# Patient Record
Sex: MALE | Race: BLACK OR AFRICAN AMERICAN
[De-identification: names, ages, dates, MRNs, and addresses within clinical notes are randomized per-mention and may not be internally consistent; named-entity substitution may affect disease eponyms.]

---

## 2017-06-02 ENCOUNTER — HOSPITAL ENCOUNTER (INPATIENT)
Dept: HOSPITAL 17 - NEPE | Age: 47
LOS: 4 days | Discharge: HOME | DRG: 71 | End: 2017-06-06
Payer: COMMERCIAL

## 2017-06-02 VITALS — WEIGHT: 277.12 LBS | BODY MASS INDEX: 44.54 KG/M2 | HEIGHT: 66 IN

## 2017-06-02 VITALS
TEMPERATURE: 99 F | SYSTOLIC BLOOD PRESSURE: 164 MMHG | OXYGEN SATURATION: 100 % | RESPIRATION RATE: 28 BRPM | HEART RATE: 102 BPM | DIASTOLIC BLOOD PRESSURE: 75 MMHG

## 2017-06-02 VITALS
DIASTOLIC BLOOD PRESSURE: 145 MMHG | TEMPERATURE: 98.1 F | SYSTOLIC BLOOD PRESSURE: 283 MMHG | HEART RATE: 86 BPM | OXYGEN SATURATION: 97 % | RESPIRATION RATE: 15 BRPM

## 2017-06-02 VITALS
HEART RATE: 70 BPM | DIASTOLIC BLOOD PRESSURE: 62 MMHG | RESPIRATION RATE: 16 BRPM | OXYGEN SATURATION: 96 % | SYSTOLIC BLOOD PRESSURE: 121 MMHG

## 2017-06-02 VITALS
OXYGEN SATURATION: 96 % | HEART RATE: 70 BPM | RESPIRATION RATE: 16 BRPM | DIASTOLIC BLOOD PRESSURE: 72 MMHG | SYSTOLIC BLOOD PRESSURE: 156 MMHG

## 2017-06-02 VITALS
OXYGEN SATURATION: 98 % | DIASTOLIC BLOOD PRESSURE: 114 MMHG | SYSTOLIC BLOOD PRESSURE: 219 MMHG | HEART RATE: 64 BPM | RESPIRATION RATE: 18 BRPM

## 2017-06-02 VITALS
HEART RATE: 79 BPM | RESPIRATION RATE: 16 BRPM | SYSTOLIC BLOOD PRESSURE: 209 MMHG | DIASTOLIC BLOOD PRESSURE: 93 MMHG | OXYGEN SATURATION: 95 %

## 2017-06-02 VITALS
OXYGEN SATURATION: 100 % | SYSTOLIC BLOOD PRESSURE: 204 MMHG | DIASTOLIC BLOOD PRESSURE: 88 MMHG | HEART RATE: 74 BPM | RESPIRATION RATE: 18 BRPM

## 2017-06-02 VITALS — HEART RATE: 74 BPM

## 2017-06-02 VITALS
SYSTOLIC BLOOD PRESSURE: 147 MMHG | OXYGEN SATURATION: 100 % | HEART RATE: 74 BPM | RESPIRATION RATE: 15 BRPM | DIASTOLIC BLOOD PRESSURE: 63 MMHG | TEMPERATURE: 98.7 F

## 2017-06-02 VITALS — DIASTOLIC BLOOD PRESSURE: 57 MMHG | SYSTOLIC BLOOD PRESSURE: 147 MMHG | TEMPERATURE: 98.3 F

## 2017-06-02 VITALS — HEART RATE: 67 BPM

## 2017-06-02 DIAGNOSIS — E11.9: ICD-10-CM

## 2017-06-02 DIAGNOSIS — R51: ICD-10-CM

## 2017-06-02 DIAGNOSIS — I16.9: ICD-10-CM

## 2017-06-02 DIAGNOSIS — G47.33: ICD-10-CM

## 2017-06-02 DIAGNOSIS — G93.9: Primary | ICD-10-CM

## 2017-06-02 DIAGNOSIS — E66.9: ICD-10-CM

## 2017-06-02 DIAGNOSIS — Z79.84: ICD-10-CM

## 2017-06-02 DIAGNOSIS — I15.9: ICD-10-CM

## 2017-06-02 LAB
ANION GAP SERPL CALC-SCNC: 6 MEQ/L (ref 5–15)
APTT BLD: 27 SEC (ref 24.3–30.1)
BASOPHILS # BLD AUTO: 0.1 TH/MM3 (ref 0–0.2)
BASOPHILS NFR BLD: 0.6 % (ref 0–2)
BUN SERPL-MCNC: 18 MG/DL (ref 7–18)
CHLORIDE SERPL-SCNC: 102 MEQ/L (ref 98–107)
EOSINOPHIL # BLD: 0.1 TH/MM3 (ref 0–0.4)
EOSINOPHIL NFR BLD: 0.7 % (ref 0–4)
ERYTHROCYTE [DISTWIDTH] IN BLOOD BY AUTOMATED COUNT: 13.6 % (ref 11.6–17.2)
GFR SERPLBLD BASED ON 1.73 SQ M-ARVRAT: 81 ML/MIN (ref 89–?)
HCO3 BLD-SCNC: 28.3 MEQ/L (ref 21–32)
HCT VFR BLD CALC: 35.7 % (ref 39–51)
HEMO FLAGS: (no result)
INR PPP: 0.9 RATIO
LYMPHOCYTES # BLD AUTO: 1.8 TH/MM3 (ref 1–4.8)
LYMPHOCYTES NFR BLD AUTO: 22.2 % (ref 9–44)
MCH RBC QN AUTO: 27.7 PG (ref 27–34)
MCHC RBC AUTO-ENTMCNC: 33.3 % (ref 32–36)
MCV RBC AUTO: 83.2 FL (ref 80–100)
MONOCYTES NFR BLD: 8.3 % (ref 0–8)
NEUTROPHILS # BLD AUTO: 5.5 TH/MM3 (ref 1.8–7.7)
NEUTROPHILS NFR BLD AUTO: 68.2 % (ref 16–70)
PLATELET # BLD: 310 TH/MM3 (ref 150–450)
POTASSIUM SERPL-SCNC: 3.8 MEQ/L (ref 3.5–5.1)
PROTHROMBIN TIME: 10.1 SEC (ref 9.8–11.6)
RBC # BLD AUTO: 4.29 MIL/MM3 (ref 4.5–5.9)
SODIUM SERPL-SCNC: 136 MEQ/L (ref 136–145)
WBC # BLD AUTO: 8.1 TH/MM3 (ref 4–11)

## 2017-06-02 PROCEDURE — 74177 CT ABD & PELVIS W/CONTRAST: CPT

## 2017-06-02 PROCEDURE — 85610 PROTHROMBIN TIME: CPT

## 2017-06-02 PROCEDURE — 70496 CT ANGIOGRAPHY HEAD: CPT

## 2017-06-02 PROCEDURE — 71260 CT THORAX DX C+: CPT

## 2017-06-02 PROCEDURE — 80048 BASIC METABOLIC PNL TOTAL CA: CPT

## 2017-06-02 PROCEDURE — 85730 THROMBOPLASTIN TIME PARTIAL: CPT

## 2017-06-02 PROCEDURE — 71010: CPT

## 2017-06-02 PROCEDURE — A9579 GAD-BASE MR CONTRAST NOS,1ML: HCPCS

## 2017-06-02 PROCEDURE — 70553 MRI BRAIN STEM W/O & W/DYE: CPT

## 2017-06-02 PROCEDURE — C9113 INJ PANTOPRAZOLE SODIUM, VIA: HCPCS

## 2017-06-02 PROCEDURE — 80053 COMPREHEN METABOLIC PANEL: CPT

## 2017-06-02 PROCEDURE — 96365 THER/PROPH/DIAG IV INF INIT: CPT

## 2017-06-02 PROCEDURE — 70450 CT HEAD/BRAIN W/O DYE: CPT

## 2017-06-02 PROCEDURE — 82948 REAGENT STRIP/BLOOD GLUCOSE: CPT

## 2017-06-02 PROCEDURE — 85025 COMPLETE CBC W/AUTO DIFF WBC: CPT

## 2017-06-02 PROCEDURE — 70498 CT ANGIOGRAPHY NECK: CPT

## 2017-06-02 PROCEDURE — 96375 TX/PRO/DX INJ NEW DRUG ADDON: CPT

## 2017-06-02 RX ADMIN — HYDROCHLOROTHIAZIDE SCH MG: 25 TABLET ORAL at 18:39

## 2017-06-02 RX ADMIN — NICARDIPINE HYDROCHLORIDE SCH MLS/HR: 2.5 INJECTION INTRAVENOUS at 23:34

## 2017-06-02 RX ADMIN — LISINOPRIL SCH MG: 20 TABLET ORAL at 18:39

## 2017-06-02 RX ADMIN — INSULIN ASPART SCH: 100 INJECTION, SOLUTION INTRAVENOUS; SUBCUTANEOUS at 18:55

## 2017-06-02 RX ADMIN — PHENYTOIN SODIUM SCH MLS/HR: 50 INJECTION INTRAMUSCULAR; INTRAVENOUS at 14:30

## 2017-06-02 RX ADMIN — Medication SCH ML: at 21:47

## 2017-06-02 RX ADMIN — NICARDIPINE HYDROCHLORIDE SCH MLS/HR: 2.5 INJECTION INTRAVENOUS at 17:38

## 2017-06-02 RX ADMIN — INSULIN ASPART SCH: 100 INJECTION, SOLUTION INTRAVENOUS; SUBCUTANEOUS at 23:48

## 2017-06-02 RX ADMIN — NICARDIPINE HYDROCHLORIDE SCH MLS/HR: 2.5 INJECTION INTRAVENOUS at 19:40

## 2017-06-02 RX ADMIN — STANDARDIZED SENNA CONCENTRATE AND DOCUSATE SODIUM SCH TAB: 8.6; 5 TABLET, FILM COATED ORAL at 19:39

## 2017-06-02 NOTE — PD.CONS
__________________________________________________ (Hebert Maldonado MD)





HPI


Consult Requested By





Primary Care Physician


No Primary Care Physician


  (Hebert Maldonado MD)


Service


N[facility]


Consult Requested By


ED Physician


Reason for Consult


Aneurysm


History of Present Illness


Mr. Norwood is a 47-year-old male who presents to [facility] ED today with 

complaints of headaches.  The history was obtained with the aid of his wife at 

bedside as the patient received pain medication and is drowsy.  His wife 

reports the patient had complained of headaches located behind the right eye 

this morning.  It was severe enough that he presented to the ED for further 

evaluation.  His wife reports he has had headaches previously that comes and 

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  An MRI of brain was obtained 

which showed a mass in the right paramidline frontal possible vascular 

malformation versus cavernous angioma.  The patient denies focal weakness, 

double vision, seizures, nausea, vomiting.  He reports no personal history of 

aneurysm, tumors or cancers.  A neurosurgical evaluation was requested.  (Cira Pozo)





Review of Systems


Constitutional:  DENIES: Fever, Chills


Eyes:  DENIES: Vision loss, Double Vision


Ears, nose, mouth, throat:  DENIES: Hearing loss


Respiratory:  DENIES: Apneas, Hemoptysis, Shortness of breath


Cardiovascular:  DENIES: Chest pain


Genitourinary:  DENIES: Urinary incontinence


Neurologic:  COMPLAINS OF: Headache,  DENIES: Paresthesias, Seizures, Speech 

Problems, Poor Balance (Cira Pozo)





Past Family Social History


Allergies:  


Coded Allergies:  


     No Known Allergies (Verified , 4/3/16)


Past Medical History


Hypertension


Diabetes 


Sleep Apnea


Past Surgical History


No previous surgical history


Reported Medications


reviewed in EMR


Active Ordered Medications





Current Medications








 Medications


  (Trade)  Dose


 Ordered  Sig/Collin


 Route


 PRN Reason  Start Time


 Stop Time Status Last Admin


Dose Admin


 


 Sodium Chloride


  (NS 1000 ml Inj)  1,000 ml @ 


 84 mls/hr  [id number]


 IV


   6/2/17 14:30


     


 


 


 Sodium Chloride


  (NS Flush)  2 ml  UNSCH  PRN


 IV FLUSH


 FLUSH AFTER USING IV ACCESS  6/2/17 14:00


     


 


 


 Sodium Chloride


  (NS Flush)  2 ml  BID


 IV FLUSH


   6/2/17 21:00


     


 


 


 Acetaminophen


  (Tylenol)  650 mg  Q6H  PRN


 PO


 PAIN 1-10 AND/OR FEVER >101F  6/2/17 14:00


     


 


 


 Pantoprazole


 Sodium


  (Protonix Inj)  40 mg  DAILY


 IV


   6/3/17 09:00


     


 


 


 Ondansetron HCl


  (Zofran Inj)  4 mg  Q6H  PRN


 IV


 NAUSEA OR VOMITING  6/2/17 14:00


     


 


 


 Miscellaneous


 Information  1  Q361D


 XX


   6/2/17 14:00


    6/2/17 14:00


 


 


 Chlorhexidine


 Gluconate


  (Chlorhexidine


 2% Cloth)  3 pack


 Taper  DAILY@04


 TOP


   6/3/17 04:00


 5/30/18 03:59   


 


 


 Chlorhexidine


 Gluconate


  (Chlorhexidine


 2% Cloth)  3 pack  UNSCH  PRN


 TOP


 HYGIENIC CARE  6/2/17 14:00


     


 


 


 Senna/Docusate


 Sodium


  (Kaitlyn-Colace)  1 tab  BID


 PO


   6/2/17 21:00


     


 


 


 Magnesium


 Hydroxide


  (Milk Of


 Magnesia Liq)  30 ml  Q12H  PRN


 PO


 MILD - MODERATE CONSTIPATION  6/2/17 14:00


     


 


 


 Sennosides


  (Senokot)  17.2 mg  Q12H  PRN


 PO


 MODERATE - SEVERE CONSTIPATION  6/2/17 14:00


     


 


 


 Bisacodyl


  (Dulcolax Supp)  10 mg  DAILY  PRN


 RECTAL


 SEVERE CONSITIPATION  6/2/17 14:00


     


 


 


 Lactulose 30 ml  30 ml  DAILY  PRN


 PO


 SEVERE CONSITIPATION  6/2/17 14:00


     


 


 


 Nicardipine HCl/


 Sodium Chloride


  (Cardene Inj/NS


 250 ml Inj)  260 ml @ 0


 mls/hr  TITRATE


 IV


   6/2/17 16:00


     


 


 


 Insulin Aspart


  (NovoLOG


 SUPPLEMENTAL


 SCALE)  1  Q6HR


 SQ


   6/2/17 18:00


     


 


 


 Dextrose


  (D50w (Vial) Inj)  25 ml  UNSCH  PRN


 IV


 HYPOGLYCEMIA-SEE COMMENTS  6/2/17 14:15


     


 


 


 Glucagon


  (Glucagon Inj)  1 mg  UNSCH  PRN


 IM/SQ


 HYPOGLYCEMIA-SEE COMMENTS  6/2/17 14:15


     


 


 


 Labetalol HCl


  (Trandate Inj)  20 mg  Q2HR  PRN


 IV PUSH


 SBP greater than 180mm Hg  6/2/17 15:30


   UNV  


 








Family History


denies family history of aneurysm, tumors or cancers.  has history of heart 

disease in family


Social History


denies tobacco, etoh, or illicit drug use.  (Cira Pozo)





Physical Exam


Vital Signs





 Vital Signs








  Date Time  Temp Pulse Resp B/P Pulse Ox O2 Delivery O2 Flow Rate FiO2


 


6/2/17 12:53  79 16 209/93 95   


 


6/2/17 10:18  64 18 219/114 98 Room Air  


 


6/2/17 09:19  74 18 204/88 100 Room Air  


 


6/2/17 08:58 98.1 86 15 283/145 97   








Physical Exam


Mr. Norwood is awake, but drowsy due to pain medications.  Oriented to time, 

place and person. Speech is fluent.  Follows simple commands. 





Cranial nerve examination: pupils 2-3 equal, round, and reactive to light. Extra

-ocular movements are intact. Facial motor and sensory function are normal and 

symmetrical. Gross hearing is intact, bilaterally. The uvula is midline and 

elevates symmetrically with the soft palate.  





Neck is soft and supple.   No meningismus or nuchal rigidity. 





Motor: moves both upper and lower extremities symmetrically





Sensory examination is intact to light touch in both the upper and lower 

extremities, symmetrically.  





Deep tendon reflexes are 1+ and symmetrical in the biceps, triceps, and 

brachioradialis, bilaterally, in the upper extremities. In the lower extremities

, the patellar and Achilles are 1+, bilaterally.  There is a bilateral plantar 

flexion response. Hoffmanns sign is negative.  





Cerebellar examination is intact


Laboratory





Laboratory Tests








Test 6/2/17 6/2/17





 09:25 11:00


 


White Blood Count 8.1  


 


Red Blood Count 4.29  


 


Hemoglobin 11.9  


 


Hematocrit 35.7  


 


Mean Corpuscular Volume 83.2  


 


Mean Corpuscular Hemoglobin 27.7  


 


Mean Corpuscular Hemoglobin 33.3  





Concent  


 


Red Cell Distribution Width 13.6  


 


Platelet Count 310  


 


Mean Platelet Volume 8.8  


 


Neutrophils (%) (Auto) 68.2  


 


Lymphocytes (%) (Auto) 22.2  


 


Monocytes (%) (Auto) 8.3  


 


Eosinophils (%) (Auto) 0.7  


 


Basophils (%) (Auto) 0.6  


 


Neutrophils # (Auto) 5.5  


 


Lymphocytes # (Auto) 1.8  


 


Monocytes # (Auto) 0.7  


 


Eosinophils # (Auto) 0.1  


 


Basophils # (Auto) 0.1  


 


CBC Comment DIFF FINAL  


 


Differential Comment   


 


Sodium Level 136  


 


Potassium Level 3.8  


 


Chloride Level 102  


 


Carbon Dioxide Level 28.3  


 


Anion Gap 6  


 


Blood Urea Nitrogen 18  


 


Creatinine 1.17  


 


Estimat Glomerular Filtration 81  





Rate  


 


Random Glucose 161  


 


Calcium Level 8.6  


 


Prothrombin Time  10.1 


 


Prothromb Time International  0.9 





Ratio  


 


Activated Partial  27.0 





Thromboplast Time  





 (Hebert Maldonado MD)


Physical Exam


Mr. Norwood is awake, but drowsy due to pain medications.  Oriented to time, 

place and person. Speech is fluent.  Follows simple commands. 





Cranial nerve examination: pupils 2-3 equal, round, and reactive to light. Extra

-ocular movements are intact. Facial motor and sensory function are normal and 

symmetrical. Gross hearing is intact, bilaterally. The uvula is midline and 

elevates symmetrically with the soft palate.  





Neck is soft and supple.   No meningismus or nuchal rigidity. 





Motor: moves both upper and lower extremities symmetrically





Sensory examination is intact to light touch in both the upper and lower 

extremities, symmetrically.  





Deep tendon reflexes are 1+ and symmetrical in the biceps, triceps, and 

brachioradialis, bilaterally, in the upper extremities. In the lower extremities

, the patellar and Achilles are 1+, bilaterally.  There is a bilateral plantar 

flexion response. Hoffmanns sign is negative.  





Cerebellar examination is intact to finger-to-nose test  (Cira Pozo)


Result Diagram:  


6/2/17 0925 6/2/17 0925





Imaging





Last Impressions








Head CT 6/2/17 0918 Signed





Impressions: 





 Service Date/Time:  Friday, June 2, 2017 09:32 - CONCLUSION:  1. 1 cm rounded 





 hyperdensity in the right para midline frontal region just anterior to the 





 anterior horns of the lateral ventricles. 2. Differential diagnosis is 





 extra-axial mass such as meningioma versus small cortical parenchymal 

hemorrhage 





 or small extra axial hemorrhage. Recommend MRI brain with and without contrast 





 for further evaluation.     Mariusz Sadler MD 


 


Chest X-Ray 6/2/17 0000 Signed





Impressions: 





 Service Date/Time:  Friday, June 2, 2017 14:24 - CONCLUSION:  1. Hypoinflation 





 with no acute infiltrate. 2. Borderline prominent but well compensated heart. 

   





  Harry Mar MD 


 


Brain MRI 6/2/17 0000 Signed





Impressions: 





 Service Date/Time:  Friday, June 2, 2017 11:45 - CONCLUSION:  1. Area of 





 increased density on CT corresponds to a 8-9 mm nonenhancing lesion in the 

genu 





 of the corpus callosum which does appear to contain a central vessel. This 

same 





 lesion shows a ring of hemosiderin on the axial T2-weighted images with marked 





 blooming artifact on the susceptibility weighted images characteristic of 





 regional blood. Imaging characteristics are overtly benign and I would 

consider 





 entities such as a cavernous angioma or other small vascular malformation.  I 





 believe a low-grade neoplasm or aneurysm is much less likely but I would 





 recommend a CTA of the head to exclude the latter. 2. Otherwise, mild, 

scattered 





 white matter changes.     Harry Mar MD 





 (Cira Pozo)





Attending Statement


Neuro. I have reviewed his clinical and radiological findings. Start neuro 

checks in a serial fashion.  Recommend follow up CT in 24 hrs





HTN. Treat with cardene drip and antihypertensives as needed





Pulmonary. Wean mechanical ventilation. Continue aggressive pulmonary toilette, 

nasotracheal suction, and breathing treatments with nebulizers.





PT and OT evaluation





Nutrition. NPO 





Renal. monitor closely urine output, BUN and creatinine





Endocrine. Monitor serial Acu checks and SSI as needed in detail





ID monitor for signs of infection





Protonix for stress ulcer prophylaxis





Delfin hose and SCD's for DVT prophylaxis (Hebert Maldonado MD)








Hebert Maldonado MD Jun 2, 2017 13:53


Cira Pozo Jun 2, 2017 15:37

## 2017-06-02 NOTE — RADRPT
EXAM DATE/TIME:  06/02/2017 11:45 

 

[facility] COMPARISON:     

CT BRAIN W/O CONTRAST, June 02, 2017, 9:32.

       

 

 

INDICATIONS :     

***Mass. Headache.

                     

 

CONTRAST:     

25 cc Omniscan (gadodiamide) IV

                     

 

MEDICAL HISTORY :     

Hypertension. Diabetes mellitus type 2.   

 

SURGICAL HISTORY :     

None.     

 

ENCOUNTER:     

Initial

 

ACUITY:     

1 day

 

PAIN SCORE:     

5/10

 

LOCATION:         

Head.

 

TECHNIQUE:     

Multiplanar, multisequence MRI of the brain was performed both prior to and following the administrat
ion of paramagnetic contrast.

 

FINDINGS:     

 

CEREBRUM:     

The ventricles are normal for age.  No evidence of midline shift or acute infarction.  No extraaxial 
fluid collections are seen.  The pituitary gland and suprasellar cistern are normal in configuration.


 

     Centered in the genu of the corpus callosum, there is a focal area that shows central increased 
T2 flair signal intensity with a rim of diminished attenuation on the same pulse sequence which corre
sponds to the area of increased density on the previous CTA. On the T1-weighted images, the area slig
htly diminished in signal intensity relative to the adjacent white matter tracts. I do not see defini
tive enhancement but there may be a vessel coursing through the center of this lesion and a hemosider
in ring is identified on the axial T2-weighted images with marked blooming artifact on the susceptibi
lity weighted images characteristic of regional blood.

WHITE MATTER:     

Minimal white matter changes..

 

POSTERIOR FOSSA:     

The cerebellum and brainstem are intact.  The 4th ventricle is midline. The cerebellopontine angle is
 unremarkable.  The cerebellar tonsils are normal in position.

 

DIFFUSION IMAGING:     

No focal areas of restricted diffusion are seen.  No evidence of acute infarction.

 

EXTRACRANIAL:     

The visualized portions of the orbits and paranasal sinuses are unremarkable.

 

POST-CONTRAST:     

No abnormal areas of parenchymal or dural enhancement.  No evidence of blood-brain barrier breakdown.
 Lesion in the genu of the corpus callosum does not enhance but again, there does appear to be a vess
el coursing through the center of the same.

 

CONCLUSION:     

1. Area of increased density on CT corresponds to a 8-9 mm nonenhancing lesion in the genu of the cor
pus callosum which does appear to contain a central vessel. This same lesion shows a ring of hemoside
rin on the axial T2-weighted images with marked blooming artifact on the susceptibility weighted imag
es characteristic of regional blood. Imaging characteristics are overtly benign and I would consider 
entities such as a cavernous angioma or other small vascular malformation.  I believe a low-grade hermelinda
plasm or aneurysm is much less likely but I would recommend a CTA of the head to exclude the latter.

2. Otherwise, mild, scattered white matter changes. 

 

 

 Harry Mar MD on June 02, 2017 at 12:23           

Board Certified Radiologist.

 This report was verified electronically.

## 2017-06-02 NOTE — RADRPT
EXAM DATE/TIME:  06/02/2017 14:24 

 

[facility] COMPARISON:     

No previous studies available for comparison.

 

                     

INDICATIONS :     

Headaches

                     

 

MEDICAL HISTORY :            

Cardiovascular disease. Hypertension. Diabetes mellitus type 2.   

 

SURGICAL HISTORY :     

None.   

 

ENCOUNTER:     

Initial                                        

 

ACUITY:     

1 day      

 

PAIN SCORE:     

0/10

 

LOCATION:     

Bilateral chest 

 

FINDINGS:     

A single view of the chest demonstrates the lungs to be hypoinflated but clear. Accounting for low tyson
ng lines, heart size is borderline the well compensated. Osseous structures are intact with some dege
nerative spurring of the dorsal spine.

 

CONCLUSION:     

1. Hypoinflation with no acute infiltrate.

2. Borderline prominent but well compensated heart.

 

 

 

 Harry Mar MD on June 02, 2017 at 14:40           

Board Certified Radiologist.

 This report was verified electronically.

## 2017-06-02 NOTE — HHI.HP
HPI


Service


Critical Care Medicine


Primary Care Physician


No Primary Care Physician


Admission Diagnosis


Poss Intracranial Aneurysm, HTN, HA


Diagnosis:  


Travel History


International Travel<30 Days:  No


Contact w/Intl Traveler <30 Da:  No


Traveled to Known Affected Are:  No


History of Present Illness


History of Present Illness


HPI


48 yo M c/o R forehead cephalgia, gradual onset over 3 hours or so prior to 

coming to ER. It started while the patient was moving boxes for work. This 

morning he had 2 bowel movements, the prior was loose, as such the patient had 

no appetite, did not eat breakfast and did not take his lisinopril as he 

normally would every morning. HTN upon arrival noted. No LOC, numbness/tingling/

weakness. No neck stiffness. HA quality is throbbing and of moderate severity.  

Patient had a systolic blood pressure in the 230s in the ER.  He was initiated 

on a nicardipine drip.  Head CT revealed hyperdense lesion in the frontal 

region pallor midline anterior to anterior horns of the lateral ventricles.  

MRI brain and CTA ordered for further evaluation and neurosurgery consult 

requested.  Dr. Maldonado.  Patient was accepted for admission by critical care 

medicine service.  When I evaluated the patient in the ER he had just finished 

his MRI and CTA and was drowsy from Ativan and Benadryl which she had received 

earlier prior to imaging.  He knew he was at the hospital and was following 

commands appropriately.  He still had a headache that this was slightly better.

  He was on a nicardipine drip at that time at 15 mg/h with systolic blood 

pressure 160s.  He denied any history of similar symptoms previously and stated 

that he is a known diabetic and hypertensive and is usually compliant with his 

meds and has a well-controlled blood pressure and fingerstick glucoses runs 

around 120s to 140s on metformin.





 History 


PFSH


Past Medical History


ADHD:  No


Arthritis:  No


Asthma:  No


Autoimmune Disease:  No


Blood Disorders:  No


Anxiety:  No


Depression:  No


Heart Rhythm Problems:  No


Cancer:  No


Cardiovascular Problems:  Yes (HTN/ POSSIBLE MI)


High Cholesterol:  No


Chemotherapy:  No


Chest Pain:  No


Congestive Heart Failure:  No


COPD:  No


Cerebrovascular Accident:  No


Diabetes:  Yes


Patient Takes Glucophage:  Yes


Diminished Hearing:  No


Endocrine:  No


Gastrointestinal Disorders:  No


GERD:  No


Glaucoma:  No


Genitourinary:  No


Headaches:  No


Hepatitis:  No


Hiatal Hernia:  No


Hypertension:  Yes


Immune Disorder:  No


Kidney Stones:  No


Musculoskeletal:  Yes


Neurologic:  No


Psychiatric:  No


Reproductive:  No


Respiratory:  Yes


Immunizations Current:  Yes


Migraines:  No


Myocardial Infarction:  No


Radiation Therapy:  No


Renal Failure:  No


Seizures:  No


Sickle Cell Disease:  No


Sleep Apnea:  Yes


Thyroid Disease:  No


Ulcer:  No





Past Surgical History


Abdominal Surgery:  No


AICD:  No


Appendectomy:  No


Arteriovenous Shunt:  No


Cardiac Surgery:  No


Cholecystectomy:  No


Ear Surgery:  No


Endocrine Surgery:  No


Eye Surgery:  No


Genitourinary Surgery:  No


Gynecologic Surgery:  No


Insulin Pump:  No


Joint Replacement:  No


Neurologic Surgery:  No


Oral Surgery:  No


Pacemaker:  No


Thoracic Surgery:  No





Social History


Alcohol Use:  No


Tobacco Use:  No


Substance Use:  No





 Allergies-Medications 


Allergies-Medications


(Allergen,Severity, Reaction):  


Coded Allergies:  


     No Known Allergies (Verified , 4/3/16)


Reported Meds & Prescriptions





Reported Meds & Active Scripts


Active


Reported


Lisinopril 20 Mg Tab 20 Mg PO DAILY


Hydrochlorothiazide 25 Mg Tab 25 Mg PO DAILY


Metformin (Metformin HCl) 500 Mg Tab 500 Mg PO BIDPC


     With meals








 ROS 


Review of Systems


Except as stated in HPI:  all other systems reviewed are Neg


General / Constitutional:  No: Fever


HENT:  Positive: Headaches,  No: Vertigo, Lightheadedness


Neurologic:  Positive: Headache,  No: Weakness, Dizziness, Syncope, Focal 

Abnormalities, Coordination Problem, Change in Mentation, Slurred Speech, 

Paresthesia, Seizures, Sensory Disturbance





Physical Exam


Vital Signs





 Vital Signs








  Date Time  Temp Pulse Resp B/P Pulse Ox O2 Delivery O2 Flow Rate FiO2


 


6/2/17 16:25  70 16 156/72 96   


 


6/2/17 16:12  70 16 121/62 96   


 


6/2/17 12:53  79 16 209/93 95   


 


6/2/17 10:18  64 18 219/114 98 Room Air  


 


6/2/17 09:19  74 18 204/88 100 Room Air  


 


6/2/17 08:58 98.1 86 15 283/145 97   








Physical Exam


Narrative


GENERAL: 48 yo female, laying in ER stretcher, drowsy but easily arousable, not 

in any acute distress.


SKIN: Warm and dry.


HEAD: Atraumatic. Normocephalic. 


EYES: No scleral icterus. No injection or drainage. Normal ROM with conjugate 

gaze. PERRL.


ENT: No nasal bleeding or discharge.  Mucous membranes pink and moist.


NECK: Trachea midline. No JVD. 


CARDIOVASCULAR: Regular rate and rhythm.  


RESPIRATORY: No accessory muscle use. Clear to auscultation. Breath sounds 

equal bilaterally. 


GASTROINTESTINAL: Abdomen soft, non-tender, nondistended. Hepatic and splenic 

margins not palpable. 


MUSCULOSKELETAL: Extremities without clubbing, cyanosis, or edema. No obvious 

deformities. 


NEUROLOGICAL: Awake and alert. No obvious cranial nerve deficits.  Motor 

grossly within normal limits. Five out of 5 muscle strength in the arms and 

legs.  Normal speech.


PSYCHIATRIC: Appropriate mood and affect; insight and judgment normal.


Laboratory





Laboratory Tests








Test 6/2/17 6/2/17





 09:25 11:00


 


White Blood Count 8.1  


 


Red Blood Count 4.29  


 


Hemoglobin 11.9  


 


Hematocrit 35.7  


 


Mean Corpuscular Volume 83.2  


 


Mean Corpuscular Hemoglobin 27.7  


 


Mean Corpuscular Hemoglobin 33.3  





Concent  


 


Red Cell Distribution Width 13.6  


 


Platelet Count 310  


 


Mean Platelet Volume 8.8  


 


Neutrophils (%) (Auto) 68.2  


 


Lymphocytes (%) (Auto) 22.2  


 


Monocytes (%) (Auto) 8.3  


 


Eosinophils (%) (Auto) 0.7  


 


Basophils (%) (Auto) 0.6  


 


Neutrophils # (Auto) 5.5  


 


Lymphocytes # (Auto) 1.8  


 


Monocytes # (Auto) 0.7  


 


Eosinophils # (Auto) 0.1  


 


Basophils # (Auto) 0.1  


 


CBC Comment DIFF FINAL  


 


Differential Comment   


 


Sodium Level 136  


 


Potassium Level 3.8  


 


Chloride Level 102  


 


Carbon Dioxide Level 28.3  


 


Anion Gap 6  


 


Blood Urea Nitrogen 18  


 


Creatinine 1.17  


 


Estimat Glomerular Filtration 81  





Rate  


 


Random Glucose 161  


 


Calcium Level 8.6  


 


Prothrombin Time  10.1 


 


Prothromb Time International  0.9 





Ratio  


 


Activated Partial  27.0 





Thromboplast Time  








Result Diagram:  


6/2/17 0925 6/2/17 0925





Imaging





Last Impressions








Head CTA 6/2/17 1316 Signed





Impressions: 





 Service Date/Time:  Friday, June 2, 2017 14:17 - CONCLUSION: Negative CTA of 

the 





 brain.  Enhancing lesions involve the corpus callosum, dense white matter 





 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  





 MS can give similar appearance but there are no other lesions in the brain to 





 suggest this is the diagnosis.  This is an unusual place for an isolated brain 





 metastasis.  Trev Stark MD  FACR


 


Head CT 6/2/17 0918 Signed





Impressions: 





 Service Date/Time:  Friday, June 2, 2017 09:32 - CONCLUSION:  1. 1 cm rounded 





 hyperdensity in the right para midline frontal region just anterior to the 





 anterior horns of the lateral ventricles. 2. Differential diagnosis is 





 extra-axial mass such as meningioma versus small cortical parenchymal 

hemorrhage 





 or small extra axial hemorrhage. Recommend MRI brain with and without contrast 





 for further evaluation.     Mariusz Sadler MD 


 


Chest X-Ray 6/2/17 0000 Signed





Impressions: 





 Service Date/Time:  Friday, June 2, 2017 14:24 - CONCLUSION:  1. Hypoinflation 





 with no acute infiltrate. 2. Borderline prominent but well compensated heart. 

   





  Harry Mar MD 


 


Brain MRI 6/2/17 0000 Signed





Impressions: 





 Service Date/Time:  Friday, June 2, 2017 11:45 - CONCLUSION:  1. Area of 





 increased density on CT corresponds to a 8-9 mm nonenhancing lesion in the 

genu 





 of the corpus callosum which does appear to contain a central vessel. This 

same 





 lesion shows a ring of hemosiderin on the axial T2-weighted images with marked 





 blooming artifact on the susceptibility weighted images characteristic of 





 regional blood. Imaging characteristics are overtly benign and I would 

consider 





 entities such as a cavernous angioma or other small vascular malformation.  I 





 believe a low-grade neoplasm or aneurysm is much less likely but I would 





 recommend a CTA of the head to exclude the latter. 2. Otherwise, mild, 

scattered 





 white matter changes.     Harry Mar MD 











Assessment and Plan


Assessment and Plan


47-year-old male with:


Hypertensive emergency


Intracranial lesion in the frontal area (possible mass)


Diabetes mellitus





Plan:


Neuro: Neurosurgery has been consulted and patient has been evaluated by Dr. Maldonado.  Further management for intracranial lesion per Dr. Maldonado.  Continue 

neuro checks


Cardiovascular: On nicardipine drip.  Labetalol when necessary added.  Will 

resume by mouth lisinopril and add Norvasc 10 mg by mouth daily for better 

blood pressure control.


Pulmonary: Supplemental O2 as needed.  Bronchodilators when necessary


GI/liver: Advance by mouth diet if okay with neurosurgery.  We will resume by 

mouth meds.


Renal/: Follow intake output, monitor and replete electro lites, follow BUN/

creatinine.  IV hydration


ID: No indication for antibiotics at this time.


Endocrine: SSI for glycemic control.  Hold metformin in view of IV contrast 

exposure and nothing by mouth.


Prophylaxis: PPI/SCDs.  Subcutaneous heparin when okay with neurosurgery


Discussed with ER physician, discussed with ER RN.


Condition critical.





Time spent on critical care excluding procedures 50 minutes








Dandy Hills MD Jun 2, 2017 17:29

## 2017-06-02 NOTE — RADRPT
EXAM DATE/TIME:  06/02/2017 14:17 

 

[facility] COMPARISON:     No previous studies available for comparison. 

 

TECHNIQUE:     Volumetric scanning was performed using a multirow detector CT scanner.  The data was 
post processed with a variety of visualization algorithms including full-volume maximum intensity pro
jection, multiplanar sliding thin-slab reformation, curved-planar reformation, and surface-rendering 
techniques.  Using automated exposure control and adjustment of the mA and/or kV according to patient
 size, radiation dose was kept as low as reasonably achievable to obtain optimal diagnostic quality i
mages. 

 

FINDINGS:     

AORTIC ARCH:     There is a three-vessel origin of the great vessels from the aorta.  No evidence of 
ostial narrowing. 

RIGHT CAROTID:     The common carotid artery is intact. The carotid bulb has a normal configuration w
ithout ulceration or narrowing. Focal calcified plaque at the origin of the internal carotid artery w
ith resultant mild, less than 50%, stenosis. No significant ulceration. The internal carotid artery l
umen is otherwise smooth without stenosis.  The external carotid artery is intact.

LEFT CAROTID:     The common carotid artery is intact.  The carotid bulb has a normal configuration. 
Calcified plaque extending from the ball of the into the origin of the left internal carotid artery w
ith resultant mild, less than 50%, stenosis. No significant ulceration. The internal carotid artery l
umen is otherwise smooth without stenosis.  The external carotid artery is intact.

VERTEBRALS:     Asymmetric, left dominant, vertebral arteries.  No stenotic lesions are seen. 

SOFT TISSUES:      Borderline level 2, 3, and 4 cervical nodes.

 

CONCLUSION:     

1. Very mild calcified plaque involving the origin of the internal carotid arteries bilaterally resul
ting in mild, less than 50%, stenosis.

2. Otherwise, unremarkable CTA examination of the neck.

 

 

 Elkin Rodriguez MD on June 02, 2017 at 16:47           

Board Certified Radiologist.

 This report was verified electronically.

## 2017-06-02 NOTE — PD
HPI


Chief Complaint:  Hypertension


Time Seen by Provider:  09:09


Travel History


International Travel<30 days:  No


Contact w/Intl Traveler<30days:  No


Traveled to known affect area:  No





History of Present Illness


HPI


46 yo M c/o R forehead cephalgia, gradual onset over last 3 hours or so. It 

started while the patient was moving boxes for work. This morning he had 2 

bowel movements, the prior was loose, as such the patient had no appetite, did 

not eat breakfast and did not take his lisinopril as he normally would every 

morning. HTN upon arrival noted. No LOC, numbness/tingling/weakness. No neck 

stiffness. HA quality is throbbing and of moderate severity.





PFSH


Past Medical History


ADHD:  No


Arthritis:  No


Asthma:  No


Autoimmune Disease:  No


Blood Disorders:  No


Anxiety:  No


Depression:  No


Heart Rhythm Problems:  No


Cancer:  No


Cardiovascular Problems:  Yes (HTN/ POSSIBLE MI)


High Cholesterol:  No


Chemotherapy:  No


Chest Pain:  No


Congestive Heart Failure:  No


COPD:  No


Cerebrovascular Accident:  No


Diabetes:  Yes


Patient Takes Glucophage:  Yes


Diminished Hearing:  No


Endocrine:  No


Gastrointestinal Disorders:  No


GERD:  No


Glaucoma:  No


Genitourinary:  No


Headaches:  No


Hepatitis:  No


Hiatal Hernia:  No


Hypertension:  Yes


Immune Disorder:  No


Kidney Stones:  No


Musculoskeletal:  Yes


Neurologic:  No


Psychiatric:  No


Reproductive:  No


Respiratory:  Yes


Immunizations Current:  Yes


Migraines:  No


Myocardial Infarction:  No


Radiation Therapy:  No


Renal Failure:  No


Seizures:  No


Sickle Cell Disease:  No


Sleep Apnea:  Yes


Thyroid Disease:  No


Ulcer:  No





Past Surgical History


Abdominal Surgery:  No


AICD:  No


Appendectomy:  No


Arteriovenous Shunt:  No


Cardiac Surgery:  No


Cholecystectomy:  No


Ear Surgery:  No


Endocrine Surgery:  No


Eye Surgery:  No


Genitourinary Surgery:  No


Gynecologic Surgery:  No


Insulin Pump:  No


Joint Replacement:  No


Neurologic Surgery:  No


Oral Surgery:  No


Pacemaker:  No


Thoracic Surgery:  No





Social History


Alcohol Use:  No


Tobacco Use:  No


Substance Use:  No





Allergies-Medications


(Allergen,Severity, Reaction):  


Coded Allergies:  


     No Known Allergies (Verified , 4/3/16)


Reported Meds & Prescriptions





Reported Meds & Active Scripts


Active


Reported


Lisinopril 20 Mg Tab 20 Mg PO DAILY


Hydrochlorothiazide 25 Mg Tab 25 Mg PO DAILY


Metformin (Metformin HCl) 500 Mg Tab 500 Mg PO BIDPC


     With meals








Review of Systems


Except as stated in HPI:  all other systems reviewed are Neg


General / Constitutional:  No: Fever


HENT:  Positive: Headaches,  No: Vertigo, Lightheadedness


Neurologic:  Positive: Headache,  No: Weakness, Dizziness, Syncope, Focal 

Abnormalities, Coordination Problem, Change in Mentation, Slurred Speech, 

Paresthesia, Seizures, Sensory Disturbance





Physical Exam


Narrative


GENERAL: 46 yo M, WNWD, mild distress 2/2 cephalgia


SKIN: Warm and dry.


HEAD: Atraumatic. Normocephalic. 


EYES: No scleral icterus. No injection or drainage. Normal ROM with conjugate 

gaze. PERRL.


ENT: No nasal bleeding or discharge.  Mucous membranes pink and moist.


NECK: Trachea midline. No JVD. 


CARDIOVASCULAR: Regular rate and rhythm.  


RESPIRATORY: No accessory muscle use. Clear to auscultation. Breath sounds 

equal bilaterally. 


GASTROINTESTINAL: Abdomen soft, non-tender, nondistended. Hepatic and splenic 

margins not palpable. 


MUSCULOSKELETAL: Extremities without clubbing, cyanosis, or edema. No obvious 

deformities. 


NEUROLOGICAL: Awake and alert. No obvious cranial nerve deficits.  Motor 

grossly within normal limits. Five out of 5 muscle strength in the arms and 

legs.  Normal speech.


PSYCHIATRIC: Appropriate mood and affect; insight and judgment normal.





Data


Data


Last Documented VS





Vital Signs








  Date Time  Temp Pulse Resp B/P Pulse Ox O2 Delivery O2 Flow Rate FiO2


 


6/2/17 12:53  79 16 209/93 95   


 


6/2/17 10:18      Room Air  


 


6/2/17 08:58 98.1       





VS reviewed


Orders





 Complete Blood Count With Diff (6/2/17 09:18)


Basic Metabolic Panel (Bmp) (6/2/17 09:18)


Ct Brain W/O Iv Contrast(Rout) (6/2/17 09:18)


Ecg Monitoring (6/2/17 09:18)


Iv Access Insert/Monitor (6/2/17 09:18)


Oximetry (6/2/17 09:18)


Sodium Chloride 0.9% Flush (Ns Flush) (6/2/17 09:30)


Acetaminophen (Tylenol) (6/2/17 09:30)


Prochlorperazine Inj (Compazine Inj) (6/2/17 09:30)


Diphenhydramine Inj (Benadryl Inj) (6/2/17 09:30)


Lisinopril (Prinivil) (6/2/17 09:30)


Mri Brain W&W/O Contrast (6/2/17 )


Nicardipine Inj (Cardene Inj) (6/2/17 11:00)


Lorazepam Inj (Ativan Inj) (6/2/17 11:15)


Act Partial Throm Time (Ptt) (6/2/17 11:05)


Prothrombin Time / Inr (Pt) (6/2/17 11:05)


Gadodiamide Pf Inj (Omniscan Pf Inj) (6/2/17 12:00)


Cta Brain W Iv Contrast W 3d (6/2/17 13:16)


Cta Neck W Iv Contrast W 3d (6/2/17 )


Admit Order (Ed Use Only) (6/2/17 13:50)





Labs





 Laboratory Tests








Test 6/2/17 6/2/17





 09:25 11:00


 


White Blood Count 8.1 TH/MM3 


 


Red Blood Count 4.29 MIL/MM3 


 


Hemoglobin 11.9 GM/DL 


 


Hematocrit 35.7 % 


 


Mean Corpuscular Volume 83.2 FL 


 


Mean Corpuscular Hemoglobin 27.7 PG 


 


Mean Corpuscular Hemoglobin 33.3 % 





Concent  


 


Red Cell Distribution Width 13.6 % 


 


Platelet Count 310 TH/MM3 


 


Mean Platelet Volume 8.8 FL 


 


Neutrophils (%) (Auto) 68.2 % 


 


Lymphocytes (%) (Auto) 22.2 % 


 


Monocytes (%) (Auto) 8.3 % 


 


Eosinophils (%) (Auto) 0.7 % 


 


Basophils (%) (Auto) 0.6 % 


 


Neutrophils # (Auto) 5.5 TH/MM3 


 


Lymphocytes # (Auto) 1.8 TH/MM3 


 


Monocytes # (Auto) 0.7 TH/MM3 


 


Eosinophils # (Auto) 0.1 TH/MM3 


 


Basophils # (Auto) 0.1 TH/MM3 


 


CBC Comment DIFF FINAL  


 


Differential Comment   


 


Sodium Level 136 MEQ/L 


 


Potassium Level 3.8 MEQ/L 


 


Chloride Level 102 MEQ/L 


 


Carbon Dioxide Level 28.3 MEQ/L 


 


Anion Gap 6 MEQ/L 


 


Blood Urea Nitrogen 18 MG/DL 


 


Creatinine 1.17 MG/DL 


 


Estimat Glomerular Filtration 81 ML/MIN 





Rate  


 


Random Glucose 161 MG/DL 


 


Calcium Level 8.6 MG/DL 


 


Prothrombin Time  10.1 SEC


 


Prothromb Time International  0.9 RATIO





Ratio  


 


Activated Partial  27.0 SEC





Thromboplast Time  











MDM


Medical Decision Making


Medical Screen Exam Complete:  Yes


Emergency Medical Condition:  Yes


Medical Record Reviewed:  Yes


Differential Diagnosis


aneurysm, ICH, HTN emergency, sinus disease, HTN, hypertensive emergency


Narrative Course


Last 24 hours Impressions








Head CT 6/2/17 0918 Signed





Impressions: 





 Service Date/Time:  Friday, June 2, 2017 09:32 - CONCLUSION:  1. 1 cm rounded 





 hyperdensity in the right para midline frontal region just anterior to the 





 anterior horns of the lateral ventricles. 2. Differential diagnosis is 





 extra-axial mass such as meningioma versus small cortical parenchymal 

hemorrhage 





 or small extra axial hemorrhage. Recommend MRI brain with and without contrast 





 for further evaluation.     Mariusz Sadler MD 





CBC & BMP Diagram


6/2/17 09:25








1020AM Pt resting comfortably. BP approx 200/100. No focal neuro deficit. 

Headache resolved.


Cardene drip started. Goal of 140/90.


MR added with aneurysm not excluded.


D/w Dr Maldonado for neurosurgery, CTA added, CT techs notified, CHHAYA Pozo notified 

with CTA results by me. 


D/w Dr Hills for intensivist service, ok for [facility] admission.


Ongoing monitoring while in ER.


Critical Care Narrative


Aggregate critical care time was 40 minutes. Time to perform other separately 

billable procedures was not  


included in the critical care time. My time did not include minutes spent 

treating any other patients simultaneously or on  


activities that did not directly contribute to the patient's treatment.  





The services I provided to this patient were to treat and/or prevent clinically 

significant deterioration that could result  


in: intracranial hemorrhage, permanent neurologic injury





I provided critical care services requiring my management, as noted below:


Chart data review, documentation time, medication orders and management, vital 

sign assessments/reviewing monitor data,  


ordering and reviewing lab tests, ordering and interpreting/reviewing x-rays 

and diagnostic studies, care of the patient  


and discussion of the patient with the admitting physicians.





Diagnosis





 Primary Impression:  


 Headache


 Qualified Code:  R51 - Nonintractable headache, unspecified chronicity pattern

, unspecified headache type


 Additional Impressions:  


 Hypertension


 Qualified Code:  I15.9 - Secondary hypertension


 Intracranial mass





Admitting Information


Admitting Physician Requests:  Neal Tejada MD Jun 2, 2017 09:20

## 2017-06-02 NOTE — RADRPT
EXAM DATE/TIME:  06/02/2017 14:17 

 

[facility] COMPARISON:     MRI BRAIN  W & W/O CONTRAST, June 02, 2017, 11:45.

 

INDICATIONS :     Headaches right side pain. 

                      

IV CONTRAST:     97 cc Omnipaque 350 (iohexol) IV ; Cumulative dose for multiple exams.

                      

RADIATION DOSE:     28.80 CTDIvol (mGy) ; Combined studies

 

MEDICAL HISTORY :     Cardiovascular disease. Hypertension. Diabetes mellitus type 2.

SURGICAL HISTORY :      None. 

ENCOUNTER:      Initial

ACUITY:      1 day

PAIN SCALE:      7/10

LOCATION:        cranial 

 

TECHNIQUE:     Volumetric scanning was performed using a multi-row detector CT scanner.  The data was
 post processed with a variety of visualization algorithms including full volume maximum intensity pr
ojection, multi-planar sliding thin slab reformation, curved planar reformation, and surface renderin
g techniques.  Using automated exposure control and adjustment of the mA and/or kV according to patie
nt size, radiation dose was kept as low as reasonably achievable to obtain optimal diagnostic quality
 images. 

 

FINDINGS:     

There is excellent visualization of the major intracranial arteries out to the second-order branch ve
ssels.  There is no evidence for aneurysm, vessel truncation or stenosis, and no evidence for vascula
r malformation.

 

 

CONCLUSION:     Negative CTA of the brain.  Enhancing lesions involve the corpus callosum, dense whit
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  MS can give s
imilar appearance but there are no other lesions in the brain to suggest this is the diagnosis.  This
 is an unusual place for an isolated brain metastasis.

 

Trev Stark MD FACR on June 02, 2017 at 15:35           

Board Certified Radiologist.

 This report was verified electronically.

## 2017-06-02 NOTE — RADRPT
EXAM DATE/TIME:  06/02/2017 09:32 

 

[facility] COMPARISON:     

No previous studies available for comparison.

 

 

INDICATIONS :     

Headaches since this morning. 

                      

 

RADIATION DOSE:     

50.30 CTDIvol (mGy) 

 

 

 

MEDICAL HISTORY :     

Cardiovascular disease. Hypertension. Diabetes mellitus type 2.

 

SURGICAL HISTORY :      

None. 

 

ENCOUNTER:      

Initial

 

ACUITY:      

1 day

 

PAIN SCALE:      

3/10

 

LOCATION:         

 

TECHNIQUE:     

Multiple contiguous axial images were obtained of the head.  Using automated exposure control and adj
ustment of the mA and/or kV according to patient size, radiation dose was kept as low as reasonably a
chievable to obtain optimal diagnostic quality images. 

 

FINDINGS:     

 

CEREBRUM:     

1.0 x 0.5 cm hyperdensity is seen in the right para midline frontal region just anterior to the anter
ior horns of the lateral ventricles. No other evidence of mass or hemorrhage.

 

POSTERIOR FOSSA:     

The cerebellum and brainstem are intact.  The 4th ventricle is midline.  The cerebellopontine angle i
s unremarkable.

 

EXTRACRANIAL:     

The visualized portion of the orbits is intact.

 

SKULL:     

The calvaria is intact.  No evidence of skull fracture.

 

CONCLUSION:     

1. 1 cm rounded hyperdensity in the right para midline frontal region just anterior to the anterior h
orns of the lateral ventricles.

2. Differential diagnosis is extra-axial mass such as meningioma versus small cortical parenchymal he
morrhage or small extra axial hemorrhage. Recommend MRI brain with and without contrast for further e
valuation. 

 

 

 Mariusz Sadler MD on June 02, 2017 at 9:38           

Board Certified Radiologist.

 This report was verified electronically.

## 2017-06-03 VITALS
SYSTOLIC BLOOD PRESSURE: 145 MMHG | TEMPERATURE: 98.7 F | HEART RATE: 82 BPM | OXYGEN SATURATION: 99 % | RESPIRATION RATE: 24 BRPM | DIASTOLIC BLOOD PRESSURE: 70 MMHG

## 2017-06-03 VITALS
TEMPERATURE: 98.7 F | RESPIRATION RATE: 16 BRPM | DIASTOLIC BLOOD PRESSURE: 64 MMHG | OXYGEN SATURATION: 96 % | SYSTOLIC BLOOD PRESSURE: 143 MMHG | HEART RATE: 67 BPM

## 2017-06-03 VITALS
DIASTOLIC BLOOD PRESSURE: 67 MMHG | HEART RATE: 76 BPM | SYSTOLIC BLOOD PRESSURE: 145 MMHG | OXYGEN SATURATION: 97 % | RESPIRATION RATE: 16 BRPM | TEMPERATURE: 98.5 F

## 2017-06-03 VITALS
SYSTOLIC BLOOD PRESSURE: 151 MMHG | TEMPERATURE: 98.1 F | OXYGEN SATURATION: 97 % | RESPIRATION RATE: 16 BRPM | DIASTOLIC BLOOD PRESSURE: 69 MMHG | HEART RATE: 78 BPM

## 2017-06-03 VITALS — HEART RATE: 74 BPM

## 2017-06-03 VITALS — HEART RATE: 70 BPM

## 2017-06-03 VITALS
OXYGEN SATURATION: 99 % | DIASTOLIC BLOOD PRESSURE: 72 MMHG | HEART RATE: 86 BPM | RESPIRATION RATE: 20 BRPM | TEMPERATURE: 98.7 F | SYSTOLIC BLOOD PRESSURE: 158 MMHG

## 2017-06-03 VITALS
TEMPERATURE: 98.9 F | DIASTOLIC BLOOD PRESSURE: 58 MMHG | OXYGEN SATURATION: 98 % | RESPIRATION RATE: 20 BRPM | SYSTOLIC BLOOD PRESSURE: 138 MMHG | HEART RATE: 78 BPM

## 2017-06-03 VITALS — HEART RATE: 78 BPM

## 2017-06-03 VITALS — HEART RATE: 86 BPM

## 2017-06-03 VITALS — HEART RATE: 98 BPM

## 2017-06-03 LAB
ALP SERPL-CCNC: 64 U/L (ref 45–117)
ALT SERPL-CCNC: 33 U/L (ref 12–78)
ANION GAP SERPL CALC-SCNC: 8 MEQ/L (ref 5–15)
AST SERPL-CCNC: 16 U/L (ref 15–37)
BASOPHILS # BLD AUTO: 0.1 TH/MM3 (ref 0–0.2)
BASOPHILS NFR BLD: 0.8 % (ref 0–2)
BILIRUB SERPL-MCNC: 0.4 MG/DL (ref 0.2–1)
BUN SERPL-MCNC: 16 MG/DL (ref 7–18)
CHLORIDE SERPL-SCNC: 100 MEQ/L (ref 98–107)
EOSINOPHIL # BLD: 0.1 TH/MM3 (ref 0–0.4)
EOSINOPHIL NFR BLD: 1.1 % (ref 0–4)
ERYTHROCYTE [DISTWIDTH] IN BLOOD BY AUTOMATED COUNT: 13.7 % (ref 11.6–17.2)
GFR SERPLBLD BASED ON 1.73 SQ M-ARVRAT: 92 ML/MIN (ref 89–?)
HCO3 BLD-SCNC: 28.4 MEQ/L (ref 21–32)
HCT VFR BLD CALC: 37.2 % (ref 39–51)
HEMO FLAGS: (no result)
LYMPHOCYTES # BLD AUTO: 2.3 TH/MM3 (ref 1–4.8)
LYMPHOCYTES NFR BLD AUTO: 24.5 % (ref 9–44)
MCH RBC QN AUTO: 27 PG (ref 27–34)
MCHC RBC AUTO-ENTMCNC: 32.4 % (ref 32–36)
MCV RBC AUTO: 83.4 FL (ref 80–100)
MONOCYTES NFR BLD: 8.9 % (ref 0–8)
NEUTROPHILS # BLD AUTO: 6 TH/MM3 (ref 1.8–7.7)
NEUTROPHILS NFR BLD AUTO: 64.7 % (ref 16–70)
PLATELET # BLD: 305 TH/MM3 (ref 150–450)
POTASSIUM SERPL-SCNC: 3.7 MEQ/L (ref 3.5–5.1)
RBC # BLD AUTO: 4.47 MIL/MM3 (ref 4.5–5.9)
SODIUM SERPL-SCNC: 136 MEQ/L (ref 136–145)
WBC # BLD AUTO: 9.2 TH/MM3 (ref 4–11)

## 2017-06-03 RX ADMIN — NICARDIPINE HYDROCHLORIDE SCH MLS/HR: 2.5 INJECTION INTRAVENOUS at 11:28

## 2017-06-03 RX ADMIN — HYDROCHLOROTHIAZIDE SCH MG: 25 TABLET ORAL at 08:49

## 2017-06-03 RX ADMIN — NICARDIPINE HYDROCHLORIDE SCH MLS/HR: 2.5 INJECTION INTRAVENOUS at 02:29

## 2017-06-03 RX ADMIN — STANDARDIZED SENNA CONCENTRATE AND DOCUSATE SODIUM SCH TAB: 8.6; 5 TABLET, FILM COATED ORAL at 21:00

## 2017-06-03 RX ADMIN — PHENYTOIN SODIUM SCH MLS/HR: 50 INJECTION INTRAMUSCULAR; INTRAVENOUS at 02:30

## 2017-06-03 RX ADMIN — INSULIN ASPART SCH: 100 INJECTION, SOLUTION INTRAVENOUS; SUBCUTANEOUS at 18:00

## 2017-06-03 RX ADMIN — INSULIN ASPART SCH: 100 INJECTION, SOLUTION INTRAVENOUS; SUBCUTANEOUS at 05:51

## 2017-06-03 RX ADMIN — PANTOPRAZOLE SODIUM SCH MG: 40 INJECTION, POWDER, FOR SOLUTION INTRAVENOUS at 08:48

## 2017-06-03 RX ADMIN — Medication SCH ML: at 21:00

## 2017-06-03 RX ADMIN — CHLORHEXIDINE GLUCONATE SCH PACK: 500 CLOTH TOPICAL at 02:30

## 2017-06-03 RX ADMIN — STANDARDIZED SENNA CONCENTRATE AND DOCUSATE SODIUM SCH TAB: 8.6; 5 TABLET, FILM COATED ORAL at 08:49

## 2017-06-03 RX ADMIN — Medication SCH ML: at 08:48

## 2017-06-03 RX ADMIN — INSULIN ASPART SCH: 100 INJECTION, SOLUTION INTRAVENOUS; SUBCUTANEOUS at 13:48

## 2017-06-03 RX ADMIN — NICARDIPINE HYDROCHLORIDE SCH MLS/HR: 2.5 INJECTION INTRAVENOUS at 05:50

## 2017-06-03 RX ADMIN — LISINOPRIL SCH MG: 20 TABLET ORAL at 08:49

## 2017-06-03 NOTE — RADRPT
EXAM DATE/TIME:  06/03/2017 14:59 

 

[facility] COMPARISON:     

CT THORAX W CONTRAST, June 03, 2017, 14:59.

 

 

INDICATIONS :     

Evaluate for metastatic disease.

                      

 

IV CONTRAST:     

100 cc Omnipaque 350 (iohexol) IV ; Cumulative dose for multiple exams.

 

 

ORAL CONTRAST:      

No oral contrast ingested.

                      

 

RADIATION DOSE:     

9.96 CTDIvol (mGy) ; Combined studies - Thorax/Abdomen/Pelvis

 

 

MEDICAL HISTORY :     

Hypertension.  

 

SURGICAL HISTORY :      

None. 

 

ENCOUNTER:      

Initial

 

ACUITY:      

1 day

 

PAIN SCALE:      

Non-responsive

 

LOCATION:       

Bilateral  abdomen.

 

TECHNIQUE:     

Volumetric scanning of the abdomen and pelvis was performed.  Using automated exposure control and ad
justment of the mA and/or kV according to patient size, radiation dose was kept as low as reasonably 
achievable to obtain optimal diagnostic quality images. 

 

FINDINGS:     

 

LOWER LUNGS:     

The visualized lower lungs are clear.

 

LIVER:     

Diffuse hepatic steatosis. No focal mass identified. Gallbladder are within normal limits.

 

SPLEEN:     

Normal size without lesion.

 

PANCREAS:     

Within normal limits.

 

KIDNEYS:     

1.5 cm cyst in the lower pole of the left kidney. Kidneys otherwise within normal limits.

 

ADRENAL GLANDS:     

Within normal limits.

 

VASCULAR:     

There is no aortic aneurysm.

 

BOWEL/MESENTERY:     

No evidence of bowel dilatation. No free air or free fluid. Appendix within normal limits.

 

ABDOMINAL WALL:     

Within normal limits.

 

RETROPERITONEUM:     

There is no lymphadenopathy. 

 

BLADDER:     

No wall thickening or mass. 

 

REPRODUCTIVE:     

Within normal limits.

 

INGUINAL:     

There is no lymphadenopathy or hernia. 

 

MUSCULOSKELETAL:     

Degenerative findings of the lumbar spine and arthritic findings of the sacroiliac joints.

 

CONCLUSION:     

Hepatic steatosis. No acute findings in the abdomen and pelvis.

 

 

 

 Mariusz Sadler MD on June 03, 2017 at 16:05           

Board Certified Radiologist.

 This report was verified electronically.

## 2017-06-03 NOTE — HHI.CCPN
Subjective


Remarks/Hospital Course


6/2: 46 yo M c/o R forehead cephalgia, gradual onset over 3 hours or so prior 

to coming to ER. It started while the patient was moving boxes for work. This 

morning he had 2 bowel movements, the prior was loose, as such the patient had 

no appetite, did not eat breakfast and did not take his lisinopril as he 

normally would every morning. HTN upon arrival noted. No LOC, numbness/tingling/

weakness. No neck stiffness. HA quality is throbbing and of moderate severity.  

Patient had a systolic blood pressure in the 230s in the ER.  He was initiated 

on a nicardipine drip.  Head CT revealed hyperdense lesion in the frontal 

region pallor midline anterior to anterior horns of the lateral ventricles.  

MRI brain and CTA ordered for further evaluation and neurosurgery consult 

requested.  Dr. Maldonado.  Patient was accepted for admission by critical care 

medicine service.  When I evaluated the patient in the ER he had just finished 

his MRI and CTA and was drowsy from Ativan and Benadryl which she had received 

earlier prior to imaging.  He knew he was at the hospital and was following 

commands appropriately.  He still had a headache that this was slightly better.

  He was on a nicardipine drip at that time at 15 mg/h with systolic blood 

pressure 160s.  He denied any history of similar symptoms previously and stated 

that he is a known diabetic and hypertensive and is usually compliant with his 

meds and has a well-controlled blood pressure and fingerstick glucoses runs 

around 120s to 140s on metformin.





6/3: Sitting up in a chair comfortably.  Denies any headache.  Denies any focal 

weakness or visual disturbance.  Denies any nausea.  Tolerating by mouth diet.





Objective





 Vital Signs








  Date Time  Temp Pulse Resp B/P Pulse Ox O2 Delivery O2 Flow Rate FiO2


 


6/3/17 12:00  78      


 


6/3/17 12:00 98.1  16 151/69 97   


 


6/3/17 07:00      Room Air  


 


6/2/17 19:00        99








 Intake and Output








 6/2/17 6/2/17 6/3/17





 08:00 16:00 00:00


 


Intake Total   1147 ml


 


Output Total   500 ml


 


Balance   647 ml








Result Diagram:  


6/3/17 0520                                                                    

            6/3/17 0520





Imaging





Last Impressions








Head CTA 6/2/17 1316 Signed





Impressions: 





 Service Date/Time:  Friday, June 2, 2017 14:17 - CONCLUSION: Negative CTA of 

the 





 brain.  Enhancing lesions involve the corpus callosum, dense white matter 





 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  





 MS can give similar appearance but there are no other lesions in the brain to 





 suggest this is the diagnosis.  This is an unusual place for an isolated brain 





 metastasis.  Trev Stark MD  FACR


 


Head CT 6/2/17 0918 Signed





Impressions: 





 Service Date/Time:  Friday, June 2, 2017 09:32 - CONCLUSION:  1. 1 cm rounded 





 hyperdensity in the right para midline frontal region just anterior to the 





 anterior horns of the lateral ventricles. 2. Differential diagnosis is 





 extra-axial mass such as meningioma versus small cortical parenchymal 

hemorrhage 





 or small extra axial hemorrhage. Recommend MRI brain with and without contrast 





 for further evaluation.     Mariusz Sadler MD 


 


Chest X-Ray 6/2/17 0000 Signed





Impressions: 





 Service Date/Time:  Friday, June 2, 2017 14:24 - CONCLUSION:  1. Hypoinflation 





 with no acute infiltrate. 2. Borderline prominent but well compensated heart. 

   





  Harry Mar MD 


 


Brain MRI 6/2/17 0000 Signed





Impressions: 





 Service Date/Time:  Friday, June 2, 2017 11:45 - CONCLUSION:  1. Area of 





 increased density on CT corresponds to a 8-9 mm nonenhancing lesion in the 

genu 





 of the corpus callosum which does appear to contain a central vessel. This 

same 





 lesion shows a ring of hemosiderin on the axial T2-weighted images with marked 





 blooming artifact on the susceptibility weighted images characteristic of 





 regional blood. Imaging characteristics are overtly benign and I would 

consider 





 entities such as a cavernous angioma or other small vascular malformation.  I 





 believe a low-grade neoplasm or aneurysm is much less likely but I would 





 recommend a CTA of the head to exclude the latter. 2. Otherwise, mild, 

scattered 





 white matter changes.     Harry Mar MD 








Objective Remarks


Narrative


GENERAL:  male sitting up in a chair not in any acute distress.


SKIN: Warm and dry.


HEAD: Atraumatic. Normocephalic. 


EYES: No scleral icterus. No injection or drainage. Normal ROM with conjugate 

gaze. PERRL.


ENT: No nasal bleeding or discharge.  Mucous membranes pink and moist.


NECK: Trachea midline. No JVD. 


CARDIOVASCULAR: Regular rate and rhythm.  


RESPIRATORY: No accessory muscle use. Clear to auscultation. Breath sounds 

equal bilaterally. 


GASTROINTESTINAL: Abdomen soft, non-tender, nondistended. Hepatic and splenic 

margins not palpable. 


MUSCULOSKELETAL: Extremities without clubbing, cyanosis, or edema. No obvious 

deformities. 


NEUROLOGICAL: Awake and alert. No obvious cranial nerve deficits.  Motor 

grossly within normal limits. Five out of 5 muscle strength in the arms and 

legs.  Normal speech.


PSYCHIATRIC: Appropriate mood and affect; insight and judgment normal.





A/P


Assessment and Plan


47-year-old male with:


Hypertensive emergency


Intracranial lesion in the frontal area (mass)


Diabetes mellitus





Plan:


Neuro: Neurosurgery has been consulted and patient has been evaluated by Dr. Maldonado.  Further management for intracranial lesion per Dr. Maldonado.  Continue 

neuro checks.  Repeat head CT to follow up.


Cardiovascular: On nicardipine drip.  Labetalol when necessary added.  Resumed 

by mouth lisinopril and add Norvasc 10 mg by mouth daily for better blood 

pressure control.


Pulmonary: Supplemental O2 as needed.  Bronchodilators when necessary


GI/liver: Advance by mouth diet if okay with neurosurgery.  We will resume by 

mouth meds.


Renal/: Follow intake output, monitor and replete electro lites, follow BUN/

creatinine.  IV hydration


ID: No indication for antibiotics at this time.


Heme-onc: Dr. Salamanca from [facility] on consulted by Dr. Maldonado to further evaluate 

intracranial mass.  He is planning on imaging studies to look for primary 

source.


Endocrine: SSI for glycemic control.  Hold metformin in view of IV contrast 

exposure and nothing by mouth.


Prophylaxis: PPI/SCDs.  Subcutaneous heparin when okay with neurosurgery


Discussed with ER physician, discussed with ER RN.








Dandy Hills MD Wagner 3, 2017 13:06

## 2017-06-03 NOTE — HHI.NSPN
__________________________________________________ (Cira Pozo)





Note Status


Status:  Progress Note (Cira Pozo)





Interval History


Interval History


Mr. Norwood is a 47-year-old male who presents to [facility] ED today with 

complaints of headaches.  The history was obtained with the aid of his wife at 

bedside as the patient received pain medication and is drowsy.  His wife 

reports the patient had complained of headaches located behind the right eye 

this morning.  It was severe enough that he presented to the ED for further 

evaluation.  His wife reports he has had headaches previously that comes and 

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  An MRI of brain was obtained 

which showed a mass in the right paramidline frontal possible vascular 

malformation versus cavernous angioma.  The patient denies focal weakness, 

double vision, seizures, nausea, vomiting.  He reports no personal history of 

aneurysm, tumors or cancers.  A neurosurgical evaluation was requested. 





6/3: headaches resolved, denies focal weakness, vomiting, seizures, vision 

changes (Cira Pozo)





Labs, Micro, & Vital Signs


Results











  Date Time  Temp Pulse Resp B/P Pulse Ox O2 Delivery O2 Flow Rate FiO2


 


6/3/17 12:00  78      


 


6/3/17 12:00 98.1 78 16 151/69 97   


 


6/3/17 10:00  74      


 


6/3/17 08:00  86      


 


6/3/17 08:00 98.7 86 20 158/72 99   


 


6/3/17 07:00      Room Air  


 


6/3/17 06:00  70      


 


6/3/17 04:00  67      


 


6/3/17 04:00 98.7 67 16 143/64 96   


 


6/3/17 02:00  70      


 


6/3/17 00:00 98.9 78 20 138/58 98   


 


6/3/17 00:00  78      


 


6/2/17 22:00  74      


 


6/2/17 20:00  102      


 


6/2/17 20:00 99.0 102 28 164/75 100   


 


6/2/17 19:00      Room Air  99


 


6/2/17 17:48  67      


 


6/2/17 17:45 98.7 74 15 147/63 100   


 


6/2/17 17:24 98.3 79 16 147/57 97   


 


6/2/17 16:25  70 16 156/72 96   


 


6/2/17 16:12  70 16 121/62 96   














 6/3/17





 07:00


 


Intake Total 2735 ml


 


Output Total 1650 ml


 


Balance 1085 ml








Constitutional





Vital Signs








  Date Time  Temp Pulse Resp B/P Pulse Ox O2 Delivery O2 Flow Rate FiO2


 


6/3/17 12:00  78      


 


6/3/17 12:00 98.1 78 16 151/69 97   


 


6/3/17 10:00  74      


 


6/3/17 08:00  86      


 


6/3/17 08:00 98.7 86 20 158/72 99   


 


6/3/17 07:00      Room Air  


 


6/3/17 06:00  70      


 


6/3/17 04:00  67      


 


6/3/17 04:00 98.7 67 16 143/64 96   


 


6/3/17 02:00  70      


 


6/3/17 00:00 98.9 78 20 138/58 98   


 


6/3/17 00:00  78      


 


6/2/17 22:00  74      


 


6/2/17 20:00  102      


 


6/2/17 20:00 99.0 102 28 164/75 100   


 


6/2/17 19:00      Room Air  99


 


6/2/17 17:48  67      


 


6/2/17 17:45 98.7 74 15 147/63 100   


 


6/2/17 17:24 98.3 79 16 147/57 97   


 


6/2/17 16:25  70 16 156/72 96   


 


6/2/17 16:12  70 16 121/62 96   














 6/3/17





 07:00


 


Intake Total 2735 ml


 


Output Total 1650 ml


 


Balance 1085 ml





 (Cira Pozo)





Review of Systems/Exam


Exam


Mr. Norwood is alert, oriented to time, place and person. Speech is fluent.  

Follows commands well. 





Cranial nerve examination: pupils 2-3 equal, round, and reactive to light. Extra

-ocular movements are intact. Facial motor and sensory function are normal and 

symmetrical. Gross hearing is intact, bilaterally. The uvula is midline and 

elevates symmetrically with the soft palate.  





Neck is soft and supple.   No meningismus or nuchal rigidity. 





Motor: moves both upper and lower extremities symmetrically





Sensory examination is intact to light touch in both the upper and lower 

extremities, symmetrically.  





Deep tendon reflexes are 1+ and symmetrical in the biceps, triceps, and 

brachioradialis, bilaterally, in the upper extremities. In the lower extremities

, the patellar and Achilles are 1+, bilaterally.  There is a bilateral plantar 

flexion response. Hoffmanns sign is negative.  





Cerebellar examination is intact to finger-to-nose test  (Cira Pozo)





Medications


Current Medications





Current Medications








 Medications


  (Trade)  Dose


 Ordered  Sig/Collin


 Route


 PRN Reason  Start Time


 Stop Time Status Last Admin


Dose Admin


 


 Sodium Chloride


  (NS 1000 ml Inj)  1,000 ml @ 


 84 mls/hr  [id number]


 IV


   6/2/17 14:30


    6/3/17 02:30


 


 


 Sodium Chloride


  (NS Flush)  2 ml  UNSCH  PRN


 IV FLUSH


 FLUSH AFTER USING IV ACCESS  6/2/17 14:00


     


 


 


 Sodium Chloride


  (NS Flush)  2 ml  BID


 IV FLUSH


   6/2/17 21:00


    6/2/17 21:47


 


 


 Acetaminophen


  (Tylenol)  650 mg  Q6H  PRN


 PO


 PAIN 1-10 AND/OR FEVER >101F  6/2/17 14:00


     


 


 


 Pantoprazole


 Sodium


  (Protonix Inj)  40 mg  DAILY


 IV


   6/3/17 09:00


    6/3/17 08:48


 


 


 Ondansetron HCl


  (Zofran Inj)  4 mg  Q6H  PRN


 IV


 NAUSEA OR VOMITING  6/2/17 14:00


     


 


 


 Miscellaneous


 Information  1  Q361D


 XX


   6/2/17 14:00


    6/2/17 14:00


 


 


 Chlorhexidine


 Gluconate


  (Chlorhexidine


 2% Cloth)  3 pack


 Taper  DAILY@04


 TOP


   6/3/17 04:00


 5/30/18 03:59  6/3/17 02:30


 


 


 Chlorhexidine


 Gluconate


  (Chlorhexidine


 2% Cloth)  3 pack  UNSCH  PRN


 TOP


 HYGIENIC CARE  6/2/17 14:00


     


 


 


 Senna/Docusate


 Sodium


  (Kaitlyn-Colace)  1 tab  BID


 PO


   6/2/17 21:00


     


 


 


 Magnesium


 Hydroxide


  (Milk Of


 Magnesia Liq)  30 ml  Q12H  PRN


 PO


 MILD - MODERATE CONSTIPATION  6/2/17 14:00


     


 


 


 Sennosides


  (Senokot)  17.2 mg  Q12H  PRN


 PO


 MODERATE - SEVERE CONSTIPATION  6/2/17 14:00


     


 


 


 Bisacodyl


  (Dulcolax Supp)  10 mg  DAILY  PRN


 RECTAL


 SEVERE CONSITIPATION  6/2/17 14:00


     


 


 


 Lactulose 30 ml  30 ml  DAILY  PRN


 PO


 SEVERE CONSITIPATION  6/2/17 14:00


     


 


 


 Nicardipine HCl/


 Sodium Chloride


  (Cardene Inj/NS


 250 ml Inj)  260 ml @ 0


 mls/hr  TITRATE


 IV


   6/2/17 16:00


    6/3/17 11:28


 


 


 Insulin Aspart


  (NovoLOG


 SUPPLEMENTAL


 SCALE)  1  Q6HR


 SQ


   6/2/17 18:00


    6/2/17 23:48


 


 


 Dextrose


  (D50w (Vial) Inj)  25 ml  UNSCH  PRN


 IV


 HYPOGLYCEMIA-SEE COMMENTS  6/2/17 14:15


     


 


 


 Glucagon


  (Glucagon Inj)  1 mg  UNSCH  PRN


 IM/SQ


 HYPOGLYCEMIA-SEE COMMENTS  6/2/17 14:15


     


 


 


 Labetalol HCl


  (Trandate Inj)  20 mg  Q2HR  PRN


 IV PUSH


 SBP greater than 180mm Hg  6/2/17 15:30


     


 


 


 Hydrochlorothiazide


  (Hydrodiuril)  25 mg  DAILY


 PO


   6/2/17 18:00


    6/3/17 08:49


 


 


 Lisinopril


  (Prinivil)  20 mg  DAILY


 PO


   6/2/17 18:00


    6/3/17 08:49


 


 


 Amlodipine


 Besylate


  (Norvasc)  10 mg  DAILY


 PO


   6/3/17 09:00


    6/3/17 08:49


 





 (Cira Pozo)





Medical Decision Making


MDM Remarks


46 y/o male presented with intractable headaches, improved


MRI Brain showed right fontal paramidline mass, poss venous malformation or 

hemangioma


CTA Head reports no aneurysm, but possible CNS lymphoma, glioblastoma (Cira Pozo)





Plan


Plan Remarks


dw pt regarding radiological findings


consult oncology for evaluation,


if recommended can obtain biopsy (Cira Pozo)





Attending Statement


The exam, history, and the medical decision-making described in the above note 

were completed with the assistance of the mid-level provider. I reviewed and 

agree with the findings presented.  I attest that I had a face-to-face 

encounter with the patient on the same day, and personally performed and 

documented my assessment and findings in the medical record. (Hebert Maldonado MD)








Cira Pozo [place] 3, 2017 13:05


Hebert Maldonado MD [place] 3, 2017 20:28

## 2017-06-03 NOTE — MB
cc:

GENO HUGHES MD, FEDERICO C. M.D. LATIF, ZAFAR MD

****

 

 

DATE OF CONSULTATION:  6/3/2017

 

HEMATOLOGY/ONCOLOGY CONSULTATION NOTE

 

PRIMARY CARE PHYSICIAN

Dr. Geno Hughes.

 

CONSULTATION REQUESTED BY

Critical Care Service.

 

REASON FOR CONSULTATION

Intracranial brain mass identified on head imaging.  The differential diagnosis

includes primary malignancy of the brain versus metastatic disease to the

brain.

 

CHIEF COMPLAINT

1. Mr. Norwood reports having had a severe headache involving the right side of

   his head (unlike any headache he has previously had).  It started hours

   prior to presentation to the hospital.

2. Elevated blood pressure.

 

HISTORY OF PRESENT ILLNESS

Mr. Norwood is a 47-year-old man with a history of high blood pressure and

diabetes.  Mr. Norwood reports being at work yesterday; he works as a cook at a

local restaurant.  He had been helping to unload a delivery truck when he felt

onset of a severe headache on the right side of the head, he also felt quite

fatigued and weak.  He stopped working at that time and went to rest however he

felt no better after about an hour of rest.  He therefore came in to the

emergency department for further workup and evaluation.  He reports he has

symptoms of headaches and fatigue.  His blood pressure on intake was noted to

be 283/145 mmHg.  He was admitted to the Critical Care Unit with hypertensive

emergency.  He has been initiated on nicardipine drip as well as resumed on his

oral antihypertensives.  Imaging studies of the head were performed on

2017 and MRI of the brain indicated an area of increased density

corresponding to a 9 mm nonenhancing lesion in the genu of the corpus callosum.

A central vessel was apparent within the lesion.  This lesion was subsequently

worked up with a head CT scan as well as CT angiogram and imaging findings were

quite nonspecific, the enhancing lesion was confirmed again in the corpus

callosum.  Differential diagnosis includes AV malforation, glioblastoma or CNS

lymphoma.  The patient has been evaluated by Neurosurgery who have asked for

Oncology to evaluate the patient as well.

 

Subjectively at this time Mr. Norwood reports feeling improved as far as his

headaches are concerned.  His blood pressure is now much closer to normal

range.

 

PAST MEDICAL HISTORY

1. Hypertension.

2. Diabetes.

3. Obesity.

4. Obstructive sleep apnea.

 

PAST SURGICAL HISTORY

None.

 

FAMILY HISTORY

Parents are both , they  of myocardial infarctions.  Sister has a

history of throat cancer, she was a smoker.

 

SOCIAL HISTORY

The patient lives at home with his ranjan recio.  He reports having had secondhand

smoke exposure in his life but he has never smoked himself.  He drinks only

occasionally.  He has one adult daughter.  He currently works as a cook at a

local restaurant.

 

ALLERGIES

NO KNOWN DRUG ALLERGIES.

 

MEDICATIONS

Current inpatient medications:

1. Nicardipine drip titrate per protocol.

2. Tylenol 650 mg p.o. q.6 hours as needed for pain.

3. Albuterol/ipratropium one ampule inhaled q.2 hours.

4. Amlodipine 10 mg p.o. daily.

5. Kaitlyn-Colace one tablet p.o. b.i.d.

6. Hydrochlorothiazide 25 mg p.o. daily.

7. Insulin sliding scale every 6 hours per protocol.

8. Labetalol 20 mg IV every 2 hours as needed for uncontrolled hypertension.

9. Lisinopril 20 mg p.o. daily.

10. Milk of Magnesia 13 ounces p.o. q.12 hours.

11. Zofran 4 mg IV q.6 hours as needed for nausea and vomiting.

12. Pantoprazole 40 mcg IV daily.

 

REVIEW OF SYSTEMS

A 13-point review of systems were obtained, the following are the pertinent

positives and negatives:

CONSTITUTIONAL:  The patient reports fatigue and weakness.  He reports no

fevers or chills.  Denies weight loss and reports a good appetite.

HEENT:  Please see HPI, he did have a headache.  Denies blurry vision but does

report having had redness of his eyes and what he felt to be swelling of his

eyes as well.  He denies difficulty swallowing or soreness in the throat.

RESPIRATORY:  Denies difficulty breathing.  Denies cough or hemoptysis.  Denies

pleuritic chest pain.

CARDIOVASCULAR:  Denies angina-like chest pain, PND or orthopnea.

GI:  Denies nausea, vomiting, diarrhea, hematochezia or melena.  He denies

changes in stool caliber.

UG:  Denies dysuria, hematuria or urinary incontinence.

CNS:  Denies any focal sensory or motor deficits.

SKIN:  No complaints.

 

PHYSICAL EXAMINATION

VITAL SIGNS:  Temperature 98.1 degrees Fahrenheit, heart rate 78 beats per

minute, blood pressure is 151/69, O2 sats are 97% on room air, respiratory rate

is 16 breaths per minute.

GENERAL PHYSICAL APPEARANCE:  Mr. Norwood is a middle-aged male, he is tall,

obese, sitting up on a chair.  He appears to be in no acute distress.

HEENT:  Head is atraumatic, normocephalic.  Conjunctivae are hyperemic,

nonicteric.  Oral exam - no pharyngeal erythema.

NECK EXAM:  No palpable cervical or supraclavicular lymphadenopathy.

RESPIRATORY EXAM:  Good air movement bilaterally, no added breath sounds.

CARDIOVASCULAR EXAM:  Regular rate and rhythm.  S1, S2.  No obvious murmurs,

rubs or gallops.

ABDOMINAL EXAM:  Obese belly, soft, no tenderness noted.  No palpable organ

enlargement specifically no hepatosplenomegaly.

LOWER EXTREMITIES:  No pretibial edema.  No calf tenderness.    CNS:  No focal

sensory or motor deficits.

 

LABORATORY FINDINGS

Blood work dated 2017:  Sodium 136, potassium 3.7, chloride 100,

bicarbonate 28.4, BUN 16, creatinine 1.04, EGFR 92, random glucose 150, calcium

8.5, total bilirubin 0.4, AST 16, ALT 33, alkaline phosphatase 64, albumin 3.

 

 

CBC:  WBC count 9.2, hemoglobin 12.1 g/dL, hematocrit 37.2%, platelet count

305, absolute neutrophil count is 6.

 

IMAGING STUDIES

MRI of the brain dated 2017:  CONCLUSION:

1.    Area of increased density on the CT scan corresponding to an 8-9 mm

nonenhancing lesion in the genu of the corpus callosum which does appear to

contain a central vessel.  This same lesion shows a range of hemosiderin on the

axial T2-weighted imaging with marked blooming artifact on the susceptibility

weighted imaging characteristic of regional blood.  Imaging characteristics are

overtly benign and I would consider entities such as cavernous angioma or other

small vascular malformation.  I believe a low grade neoplasm or aneurysm is

much less likely but I would recommend CT angiogram of the head to exclude the

latter.

 

CT angiogram of the head dated 2017:

1.   Negative CTA of the brain.  Enhancing lesion involving the corpus

callosum, dense white matter tracts.  Differential includes glioblastoma,

astrocytoma, primary CNS lymphoma, multiple sclerosis can also give a similar

appearance.  No other lesions within the brain are identified.

 

ASSESSMENT

Mr. Norwood is a 47-year-old man who came in to the emergency department

yesterday with complaints of severe headache, generalized weakness and fatigue.

He was noted to have a blood pressure of 280 mmHg/145 mmHg at presentation.  He

was admitted to the Critical Care Unit for a hypertensive emergency and his

blood pressure has been titrated down to close to normal range.  Imaging

studies of the head were performed to evaluate his symptoms of headache and he

was noted to have a 9 mm nonspecific lesion involving the anterior portion of

the corpus callosum.   The Oncology service has been asked to see him because

the differential diagnosis for this lesion includes primary CNS lymphoma,

primary brain neoplasm such as glioblastoma as well as astrocytomas.

 

The patient has been evaluated by Neurosurgery as well and he has been offered

various options including observation with repeat scans in three months versus

an open biopsy to definitively evaluate the lesion.

 

RECOMMENDATIONS

1.  I would at this point like to obtain CT imaging of the chest, abdomen and

pelvis to evaluate for a primary site in the thorax, abdomen or pelvis.  I will

also defer to Neurosurgery regarding the urgency of a biopsy needed or whether

a biopsy is needed at all.  I would be happy to see the patient whenever we do

have a tissue confirmation of a malignancy to plan further workup and

management.

 

I did explain the various approaches to the patient and his fianc e and

explained to them that the lesion that we have identified in the corpus

callosum may in fact be an incidental finding, his symptoms of headaches may

have been related to his extremely high blood pressure when he came in.  Also,

I explained to them that the lesion that we have seen may in fact represent an

AV malformation or even a malignant lesion.  If this is a malignant lesion, an

open biopsy would be required to prove that and that would require craniotomy

with accurate localization and excision.  I will defer further discussions

regarding biopsy to the neurosurgeons.

 

 

 

                              _________________________________

                              MD AUSTIN Clarke/ORLANDO

D:  6/3/2017/1:07 PM

T:  6/3/2017/3:43 PM

Visit #:  [id number]

Job #:  [id number]

## 2017-06-03 NOTE — RADRPT
EXAM DATE/TIME:  06/03/2017 14:54 

 

[facility] COMPARISON:     

CTA BRAIN W 3D RECON, June 02, 2017, 14:17.  MRI BRAIN  W & W/O CONTRAST, June 02, 2017, 11:45.  CT B
RAIN W/O CONTRAST, June 02, 2017, 9:32.

 

 

INDICATIONS :     

Evaluate for metastatic disease.

                      

 

RADIATION DOSE:     

56.35 CTDIvol (mGy) 

 

 

 

MEDICAL HISTORY :     

Hypertension.  

 

SURGICAL HISTORY :      

None. 

 

ENCOUNTER:      

Initial

 

ACUITY:      

2 days

 

PAIN SCALE:      

4/10

 

LOCATION:        

cranial 

 

TECHNIQUE:     

Multiple contiguous axial images were obtained of the head.  Using automated exposure control and adj
ustment of the mA and/or kV according to patient size, radiation dose was kept as low as reasonably a
chievable to obtain optimal diagnostic quality images. 

 

FINDINGS:     

 

CEREBRUM:     

Oval 1.5 x 0.9 cm hyperdensity in the region of the anterior corpus callosum is unchanged in size fro
m prior MRI and CT. No new hemorrhage or mass effect.

 

POSTERIOR FOSSA:     

The cerebellum and brainstem are intact.  The 4th ventricle is midline.  The cerebellopontine angle i
s unremarkable.

 

EXTRACRANIAL:     

The visualized portion of the orbits is intact.

 

SKULL:     

The calvaria is intact.  No evidence of skull fracture.

 

CONCLUSION:     No significant change. Oval hyperdensity again seen in the region of the corpus callo
sum anteriorly. 

 

 

 Mariusz Sadler MD on June 03, 2017 at 15:58           

Board Certified Radiologist.

 This report was verified electronically.

## 2017-06-03 NOTE — RADRPT
EXAM DATE/TIME:  06/03/2017 14:59 

 

[facility] COMPARISON:     

No previous studies available for comparison.

 

 

INDICATIONS :     

Evaluate for metastatic disease.

                      

 

IV CONTRAST:     

100 cc Omnipaque 350 (iohexol) IV ; Cumulative dose for multiple exams.

 

 

RADIATION DOSE:     

9.96 CTDIvol (mGy) ; Combined studies - Thorax/Abdomen/Pelvis

 

 

MEDICAL HISTORY :     

Hypertension.  

 

SURGICAL HISTORY :      

None. 

 

ENCOUNTER:      

Initial

 

ACUITY:      

1 day

 

PAIN SCALE:      

Non-responsive

 

LOCATION:       

Bilateral chest 

 

TECHNIQUE:      

Volumetric scanning of the chest was performed.  Using automated exposure control and adjustment of t
he mA and/or kV according to patient size, radiation dose was kept as low as reasonably achievable to
 obtain optimal diagnostic quality images. 

 

FINDINGS:     

 

LUNGS:     

There is no consolidation or pneumothorax.  No concerning pulmonary nodule is visualized.

 

PLEURA:     

There is no pleural thickening or pleural effusion.

 

MEDIASTINUM:     

The heart and great vessels demonstrate no acute abnormality.  There is no mediastinal or hilar lymph
adenopathy.

 

AXILLAE:     

Within normal limits.  No lymphadenopathy.

 

SKELETAL:     

Within normal limits for patient age.

 

MISCELLANEOUS:     

The visualized upper abdominal organs demonstrate no acute abnormality.

 

CONCLUSION:     

No acute findings in the chest.

 

 

 

 Mariusz Sadler MD on June 03, 2017 at 16:04           

Board Certified Radiologist.

 This report was verified electronically.

## 2017-06-04 VITALS — HEART RATE: 64 BPM

## 2017-06-04 VITALS
HEART RATE: 82 BPM | RESPIRATION RATE: 15 BRPM | TEMPERATURE: 98.3 F | SYSTOLIC BLOOD PRESSURE: 145 MMHG | OXYGEN SATURATION: 98 % | DIASTOLIC BLOOD PRESSURE: 65 MMHG

## 2017-06-04 VITALS
SYSTOLIC BLOOD PRESSURE: 156 MMHG | OXYGEN SATURATION: 98 % | TEMPERATURE: 98.3 F | HEART RATE: 75 BPM | RESPIRATION RATE: 16 BRPM | DIASTOLIC BLOOD PRESSURE: 75 MMHG

## 2017-06-04 VITALS
DIASTOLIC BLOOD PRESSURE: 91 MMHG | OXYGEN SATURATION: 99 % | TEMPERATURE: 98.1 F | SYSTOLIC BLOOD PRESSURE: 176 MMHG | HEART RATE: 81 BPM | RESPIRATION RATE: 17 BRPM

## 2017-06-04 VITALS — HEART RATE: 82 BPM

## 2017-06-04 VITALS
SYSTOLIC BLOOD PRESSURE: 178 MMHG | HEART RATE: 98 BPM | RESPIRATION RATE: 31 BRPM | TEMPERATURE: 98.3 F | OXYGEN SATURATION: 100 % | DIASTOLIC BLOOD PRESSURE: 92 MMHG

## 2017-06-04 VITALS
HEART RATE: 62 BPM | DIASTOLIC BLOOD PRESSURE: 52 MMHG | RESPIRATION RATE: 17 BRPM | OXYGEN SATURATION: 99 % | SYSTOLIC BLOOD PRESSURE: 157 MMHG | TEMPERATURE: 97.6 F

## 2017-06-04 VITALS
SYSTOLIC BLOOD PRESSURE: 162 MMHG | RESPIRATION RATE: 17 BRPM | HEART RATE: 66 BPM | TEMPERATURE: 97.6 F | OXYGEN SATURATION: 97 % | DIASTOLIC BLOOD PRESSURE: 81 MMHG

## 2017-06-04 VITALS — HEART RATE: 92 BPM

## 2017-06-04 VITALS — HEART RATE: 72 BPM

## 2017-06-04 VITALS — OXYGEN SATURATION: 99 %

## 2017-06-04 RX ADMIN — STANDARDIZED SENNA CONCENTRATE AND DOCUSATE SODIUM SCH TAB: 8.6; 5 TABLET, FILM COATED ORAL at 08:45

## 2017-06-04 RX ADMIN — INSULIN ASPART SCH: 100 INJECTION, SOLUTION INTRAVENOUS; SUBCUTANEOUS at 18:00

## 2017-06-04 RX ADMIN — LISINOPRIL SCH MG: 20 TABLET ORAL at 08:45

## 2017-06-04 RX ADMIN — Medication SCH ML: at 08:45

## 2017-06-04 RX ADMIN — PANTOPRAZOLE SODIUM SCH MG: 40 INJECTION, POWDER, FOR SOLUTION INTRAVENOUS at 08:44

## 2017-06-04 RX ADMIN — HYDROCHLOROTHIAZIDE SCH MG: 25 TABLET ORAL at 08:44

## 2017-06-04 RX ADMIN — CHLORHEXIDINE GLUCONATE SCH PACK: 500 CLOTH TOPICAL at 03:50

## 2017-06-04 RX ADMIN — INSULIN ASPART SCH: 100 INJECTION, SOLUTION INTRAVENOUS; SUBCUTANEOUS at 11:56

## 2017-06-04 RX ADMIN — INSULIN ASPART SCH: 100 INJECTION, SOLUTION INTRAVENOUS; SUBCUTANEOUS at 00:00

## 2017-06-04 RX ADMIN — Medication SCH ML: at 21:00

## 2017-06-04 RX ADMIN — STANDARDIZED SENNA CONCENTRATE AND DOCUSATE SODIUM SCH TAB: 8.6; 5 TABLET, FILM COATED ORAL at 21:00

## 2017-06-04 RX ADMIN — INSULIN ASPART SCH: 100 INJECTION, SOLUTION INTRAVENOUS; SUBCUTANEOUS at 06:00

## 2017-06-04 NOTE — PD.ONC.PN
Subjective


Subjective


Remarks


Afebrile overnight.


"I feel much better"


Denies headache or chest pain.


OK for plan to wait 2-3 months and repeat MRI.





Objective


Data











  Date Time  Temp Pulse Resp B/P Pulse Ox O2 Delivery O2 Flow Rate FiO2


 


6/4/17 07:51     99   21


 


6/4/17 07:00     100 Room Air  


 


6/4/17 06:00  64      


 


6/4/17 04:00 97.6 62 17 157/52 99   


 


6/4/17 04:00  62      


 


6/4/17 02:00  82      


 


6/4/17 00:00 98.3 82 15 145/65 98   


 


6/4/17 00:00  68      


 


6/3/17 22:00  86      


 


6/3/17 20:00 98.7 82 24 145/70 99   


 


6/3/17 20:00  82      


 


6/3/17 19:00     100 Room Air  


 


6/3/17 18:00  98      


 


6/3/17 16:00  76      


 


6/3/17 16:00 98.5 76 16 145/67 97   


 


6/3/17 14:00  78      


 


6/3/17 12:00  78      


 


6/3/17 12:00 98.1 78 16 151/69 97   


 


6/3/17 10:00  74      














 6/4/17 6/4/17 6/4/17





 07:00 15:00 23:00


 


Intake Total 240 ml  


 


Output Total 1000 ml  


 


Balance -760 ml  








Result Diagram:  


6/3/17 0520                                                                    

            6/3/17 0520





Imaging Studies





Last 48 hours Impressions








Head CT 6/3/17 0000 Signed





Impressions: 





 Service Date/Time:  Saturday, Renetta 3, 2017 14:54 - CONCLUSION: No significant 





 change. Oval hyperdensity again seen in the region of the corpus callosum 





 anteriorly.     Mariusz Sadler MD 


 


Chest CT 6/3/17 0000 Signed





Impressions: 





 Service Date/Time:  Saturday, Renetta 3, 2017 14:59 - CONCLUSION:  No acute 





 findings in the chest.     Mariusz Sadler MD 


 


Abdomen/Pelvis CT 6/3/17 0000 Signed





Impressions: 





 Service Date/Time:  Saturday, Renetta 3, 2017 14:59 - CONCLUSION:  Hepatic 





 steatosis. No acute findings in the abdomen and pelvis.     Mariusz Sadler MD 


 


Head CTA 6/2/17 1316 Signed





Impressions: 





 Service Date/Time:  Friday, June 2, 2017 14:17 - CONCLUSION: Negative CTA of 

the 





 brain.  Enhancing lesions involve the corpus callosum, dense white matter 





 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  





 MS can give similar appearance but there are no other lesions in the brain to 





 suggest this is the diagnosis.  This is an unusual place for an isolated brain 





 metastasis.  Trev Stark MD  FACR











Administered Medications








 Medications


  (Trade)  Dose


 Ordered  Sig/Collin


 Route


 PRN Reason  Start Time


 Stop Time Status Last Admin


Dose Admin


 


 Sodium Chloride


  (NS Flush)  2 ml  BID


 IV FLUSH


   6/2/17 21:00


    6/4/17 08:45


 


 


 Acetaminophen


  (Tylenol)  650 mg  Q6H  PRN


 PO


 PAIN 1-10 AND/OR FEVER >101F  6/2/17 14:00


    6/3/17 19:47


 


 


 Pantoprazole


 Sodium


  (Protonix Inj)  40 mg  DAILY


 IV


   6/3/17 09:00


    6/4/17 08:44


 


 


 Miscellaneous


 Information  1  Q361D


 XX


   6/2/17 14:00


    6/2/17 14:00


 


 


 Chlorhexidine


 Gluconate 3 pack  3 pack


 Taper  DAILY@04


 TOP


   6/3/17 04:00


 5/30/18 03:59  6/4/17 03:50


 


 


 Nicardipine HCl/


 Sodium Chloride


  (Cardene Inj/NS


 250 ml Inj)  260 ml @ 0


 mls/hr  TITRATE


 IV


   6/2/17 16:00


    6/3/17 11:28


 


 


 Insulin Aspart


  (NovoLOG


 SUPPLEMENTAL


 SCALE)  1  Q6HR


 SQ


   6/2/17 18:00


    6/4/17 06:00


 


 


 Hydrochlorothiazide


  (Hydrodiuril)  25 mg  DAILY


 PO


   6/2/17 18:00


    6/4/17 08:44


 


 


 Lisinopril


  (Prinivil)  20 mg  DAILY


 PO


   6/2/17 18:00


    6/4/17 08:45


 


 


 Amlodipine


 Besylate


  (Norvasc)  10 mg  DAILY


 PO


   6/3/17 09:00


    6/4/17 08:45


 








Objective Remarks


GENERAL: Overweight middle aged male, sitting up in chair at bedside in no 

distress. He is in good spirits, watching TV with visitor present.


SKIN: Warm and dry.


HEAD: Normocephalic.


EYES: No injection or drainage. 


NECK: Supple, trachea midline. 


CARDIOVASCULAR: +S1/S2.


RESPIRATORY: Lungs clear to auscultation. Breathing unlabored.


GASTROINTESTINAL: Abdomen soft, non-tender, nondistended. 


EXTREMITIES: No cyanosis. SCD's to BLE.


NEUROLOGICAL: A&Ox3. Equal  strengths. Follows all commands. Normal speech.





Assessment/Plan


Problem List:  


(1) Intracranial mass


Status:  Acute


Plan:  --MRI showed a 9mm nonenhancing lesion in the genu of the corpus callosum


-- Differential incl AV malformation, glioblastoma, or CNS lymphoma.


-- CT Chest, Abdomen and Pelvis negative for malignancy.








Hx/Workup: Mr Norwood came to the ER with intense headache and was admitted to 

ICU for hypertensive crisis. Imaging scans on admission showed a lesion in the 

corpus callosum.





Assessment


48 y/o male who presented to the ER with headache and was found to have a 

lesion in the corpus callosum suspicious for possible malignancy.


Plan


1. Pt feeling much better. Hypertension improved. Off Nifedipine gtt.


2. Plan to repeat MRI in 2-3 months. The lesion would be dangerous to biopsy as 

it is in a central location with in the brain.


3. Discussed with Dr Maldonado.


Attending Statement


The exam, history, and the medical decision-making described in the above note 

were completed with the assistance of the mid-level provider. I reviewed and 

agree with the findings presented.  I attest that I had a face-to-face 

encounter with the patient on the same day, and personally performed and 

documented my assessment and findings in the medical record.





Patient seen and examined, imaging studies ordered yesterday were reviewed; he 

has no evidence of malignancy in the thorax, abdomen or pelvis.


His blood pressure is now closer to normal and his headaches have completely 

resolved. He has been up out of bed and has walk without assistance and is 

awaiting transfer out of the critical care unit.


I did talk to the patient about his CT scans and explained that there is no 

evidence to suggest a primary malignancy in the abdomen or pelvis or thorax.


I have provided him my office phone number for him to call us should there be 

any changes on his head scans.


Currently the plan is for him to have repeat MRI of the brain in 3 months and 

follow-up with Dr. Maldonado in 3 months.


Patient is cleared for discharge from an oncology standpoint.








Marimar Gonzalez Jun 4, 2017 10:03


Den Leonardo MD Jun 4, 2017 12:05

## 2017-06-04 NOTE — HHI.NSPN
__________________________________________________ (Cira Pozo)





Note Status


Status:  Progress Note (Cira Pozo)





Interval History


Interval History


Mr. Norwood is a 47-year-old male who presents to [facility] today with 

complaints of headaches.  The history was obtained with the aid of his wife at 

bedside as the patient received pain medication and is drowsy.  His wife 

reports the patient had complained of headaches located behind the right eye 

this morning.  It was severe enough that he presented to the ED for further 

evaluation.  His wife reports he has had headaches previously that comes and 

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  An MRI of brain was obtained 

which showed a mass in the right paramidline frontal possible vascular 

malformation versus cavernous angioma.  The patient denies focal weakness, 

double vision, seizures, nausea, vomiting.  He reports no personal history of 

aneurysm, tumors or cancers.  A neurosurgical evaluation was requested. 





6/3: headaches resolved, denies focal weakness, vomiting, seizures, vision 

changes


6/4: stable overnight, Oncology evaluation noted.  (Cira Pozo)





Labs, Micro, & Vital Signs


Results











  Date Time  Temp Pulse Resp B/P Pulse Ox O2 Delivery O2 Flow Rate FiO2


 


6/4/17 12:00 98.1 81 17 176/91 99   


 


6/4/17 12:00  81      


 


6/4/17 10:00  72      


 


6/4/17 08:00  66      


 


6/4/17 08:00 97.6 68 17 162/81 97   


 


6/4/17 07:51     99   21


 


6/4/17 07:00     100 Room Air  


 


6/4/17 06:00  64      


 


6/4/17 04:00 97.6 62 17 157/52 99   


 


6/4/17 04:00  62      


 


6/4/17 02:00  82      


 


6/4/17 00:00 98.3 82 15 145/65 98   


 


6/4/17 00:00  68      


 


6/3/17 22:00  86      


 


6/3/17 20:00 98.7 82 24 145/70 99   


 


6/3/17 20:00  82      


 


6/3/17 19:00     100 Room Air  


 


6/3/17 18:00  98      


 


6/3/17 16:00  76      


 


6/3/17 16:00 98.5 76 16 145/67 97   


 


6/3/17 14:00  78      














 6/4/17





 07:00


 


Intake Total 2340 ml


 


Output Total 2350 ml


 


Balance -10 ml








Constitutional





Vital Signs








  Date Time  Temp Pulse Resp B/P Pulse Ox O2 Delivery O2 Flow Rate FiO2


 


6/4/17 12:00 98.1 81 17 176/91 99   


 


6/4/17 12:00  81      


 


6/4/17 10:00  72      


 


6/4/17 08:00  66      


 


6/4/17 08:00 97.6 68 17 162/81 97   


 


6/4/17 07:51     99   21


 


6/4/17 07:00     100 Room Air  


 


6/4/17 06:00  64      


 


6/4/17 04:00 97.6 62 17 157/52 99   


 


6/4/17 04:00  62      


 


6/4/17 02:00  82      


 


6/4/17 00:00 98.3 82 15 145/65 98   


 


6/4/17 00:00  68      


 


6/3/17 22:00  86      


 


6/3/17 20:00 98.7 82 24 145/70 99   


 


6/3/17 20:00  82      


 


6/3/17 19:00     100 Room Air  


 


6/3/17 18:00  98      


 


6/3/17 16:00  76      


 


6/3/17 16:00 98.5 76 16 145/67 97   


 


6/3/17 14:00  78      














 6/4/17





 07:00


 


Intake Total 2340 ml


 


Output Total 2350 ml


 


Balance -10 ml





 (Cira Pooz)





Review of Systems/Exam


Exam


Mr. Norwood is alert, oriented to time, place and person. Speech is fluent.  

Follows commands well.  





Sitting up in chair. 





Cranial nerve examination: pupils 2-3 equal, round, and reactive to light. Extra

-ocular movements are intact. Facial motor and sensory function are normal and 

symmetrical. 





Neck is soft and supple.   No meningismus or nuchal rigidity. 





Motor: moves both upper and lower extremities symmetrically





Sensory examination is intact to light touch in both the upper and lower 

extremities, symmetrically.  





Cerebellar examination is intact to finger-to-nose test  (Cira Pozo)





Medications


Current Medications





Current Medications








 Medications


  (Trade)  Dose


 Ordered  Sig/Collin


 Route


 PRN Reason  Start Time


 Stop Time Status Last Admin


Dose Admin


 


 Sodium Chloride


  (NS Flush)  2 ml  UNSCH  PRN


 IV FLUSH


 FLUSH AFTER USING IV ACCESS  6/2/17 14:00


     


 


 


 Sodium Chloride


  (NS Flush)  2 ml  BID


 IV FLUSH


   6/2/17 21:00


    6/4/17 08:45


 


 


 Acetaminophen


  (Tylenol)  650 mg  Q6H  PRN


 PO


 PAIN 1-10 AND/OR FEVER >101F  6/2/17 14:00


    6/3/17 19:47


 


 


 Pantoprazole


 Sodium


  (Protonix Inj)  40 mg  DAILY


 IV


   6/3/17 09:00


    6/4/17 08:44


 


 


 Ondansetron HCl


  (Zofran Inj)  4 mg  Q6H  PRN


 IV


 NAUSEA OR VOMITING  6/2/17 14:00


     


 


 


 Miscellaneous


 Information  1  Q361D


 XX


   6/2/17 14:00


    6/2/17 14:00


 


 


 Chlorhexidine


 Gluconate


  (Chlorhexidine


 2% Cloth)  3 pack


 Taper  DAILY@04


 TOP


   6/3/17 04:00


 5/30/18 03:59  6/4/17 03:50


 


 


 Chlorhexidine


 Gluconate


  (Chlorhexidine


 2% Cloth)  3 pack  UNSCH  PRN


 TOP


 HYGIENIC CARE  6/2/17 14:00


     


 


 


 Senna/Docusate


 Sodium


  (Kaitlyn-Colace)  1 tab  BID


 PO


   6/2/17 21:00


     


 


 


 Magnesium


 Hydroxide


  (Milk Of


 Magnesia Liq)  30 ml  Q12H  PRN


 PO


 MILD - MODERATE CONSTIPATION  6/2/17 14:00


     


 


 


 Sennosides


  (Senokot)  17.2 mg  Q12H  PRN


 PO


 MODERATE - SEVERE CONSTIPATION  6/2/17 14:00


     


 


 


 Bisacodyl


  (Dulcolax Supp)  10 mg  DAILY  PRN


 RECTAL


 SEVERE CONSITIPATION  6/2/17 14:00


     


 


 


 Lactulose


  (Lactulose Liq)  30 ml  DAILY  PRN


 PO


 SEVERE CONSITIPATION  6/2/17 14:00


     


 


 


 Insulin Aspart


  (NovoLOG


 SUPPLEMENTAL


 SCALE)  1  Q6HR


 SQ


   6/2/17 18:00


    6/4/17 11:56


 


 


 Dextrose


  (D50w (Vial) Inj)  25 ml  UNSCH  PRN


 IV


 HYPOGLYCEMIA-SEE COMMENTS  6/2/17 14:15


     


 


 


 Glucagon


  (Glucagon Inj)  1 mg  UNSCH  PRN


 IM/SQ


 HYPOGLYCEMIA-SEE COMMENTS  6/2/17 14:15


     


 


 


 Hydrochlorothiazide


  (Hydrodiuril)  25 mg  DAILY


 PO


   6/2/17 18:00


    6/4/17 08:44


 


 


 Amlodipine


 Besylate


  (Norvasc)  10 mg  DAILY


 PO


   6/3/17 09:00


    6/4/17 08:45


 


 


 Lisinopril


  (Prinivil)  40 mg  DAILY


 PO


   6/5/17 09:00


     


 





 (Cira Pozo)





Medical Decision Making


MDM Remarks


48 y/o male presented with intractable headaches, improved


MRI Brain showed right fontal paramidline mass, poss venous malformation or 

hemangioma


CTA Head reports no aneurysm, but possible CNS lymphoma, glioblastoma (Cira Pozo)





Plan


Plan Remarks


discussed with patient treatment options of brain biopsy vs watching with 

repeat MRI in 3 months


pt requests nonsurgical mgt for now


repeat MRI Brain w/wo contrast in 3 months and f/u outpatient (Cira Pozo)





Attending Statement


The exam, history, and the medical decision-making described in the above note 

were completed with the assistance of the mid-level provider. I reviewed and 

agree with the findings presented.  I attest that I had a face-to-face 

encounter with the patient on the same day, and personally performed and 

documented my assessment and findings in the medical record. (Hebert Maldonado MD)








Cira Pozo Jun 4, 2017 12:51


Hebert Maldonado MD Jun 4, 2017 18:32

## 2017-06-04 NOTE — HHI.CCPN
Subjective


Remarks/Hospital Course


6/2: 46 yo M c/o R forehead cephalgia, gradual onset over 3 hours or so prior 

to coming to ER. It started while the patient was moving boxes for work. This 

morning he had 2 bowel movements, the prior was loose, as such the patient had 

no appetite, did not eat breakfast and did not take his lisinopril as he 

normally would every morning. HTN upon arrival noted. No LOC, numbness/tingling/

weakness. No neck stiffness. HA quality is throbbing and of moderate severity.  

Patient had a systolic blood pressure in the 230s in the ER.  He was initiated 

on a nicardipine drip.  Head CT revealed hyperdense lesion in the frontal 

region pallor midline anterior to anterior horns of the lateral ventricles.  

MRI brain and CTA ordered for further evaluation and neurosurgery consult 

requested.  Dr. Maldonado.  Patient was accepted for admission by critical care 

medicine service.  When I evaluated the patient in the ER he had just finished 

his MRI and CTA and was drowsy from Ativan and Benadryl which she had received 

earlier prior to imaging.  He knew he was at the hospital and was following 

commands appropriately.  He still had a headache that this was slightly better.

  He was on a nicardipine drip at that time at 15 mg/h with systolic blood 

pressure 160s.  He denied any history of similar symptoms previously and stated 

that he is a known diabetic and hypertensive and is usually compliant with his 

meds and has a well-controlled blood pressure and fingerstick glucoses runs 

around 120s to 140s on metformin.





6/3: Sitting up in a chair comfortably.  Denies any headache.  Denies any focal 

weakness or visual disturbance.  Denies any nausea.  Tolerating by mouth diet.





6/4: Sitting up in chair.  Appears comfortable.  Denies any headache.  Off 

nicardipine drip since yesterday afternoon.  Systolic blood pressure 160s.  

Patient tells me that he had doubled his lisinopril at home for a few days on 

his own.  CT chest and CT abdomen pelvis unremarkable.





Objective





 Vital Signs








  Date Time  Temp Pulse Resp B/P Pulse Ox O2 Delivery O2 Flow Rate FiO2


 


6/4/17 10:00  72      


 


6/4/17 07:51     99   21


 


6/4/17 07:00      Room Air  


 


6/4/17 04:00 97.6  17 157/52    








 Intake and Output








 6/3/17 6/3/17 6/4/17





 08:00 16:00 00:00


 


Intake Total 1588 ml 1367 ml 733 ml


 


Output Total 1150 ml 1000 ml 350 ml


 


Balance 438 ml 367 ml 383 ml








Result Diagram:  


6/3/17 0520                                                                    

            6/3/17 0520





Imaging





Last Impressions








Head CTA 6/2/17 1316 Signed





Impressions: 





 Service Date/Time:  Friday, June 2, 2017 14:17 - CONCLUSION: Negative CTA of 

the 





 brain.  Enhancing lesions involve the corpus callosum, dense white matter 





 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  





 MS can give similar appearance but there are no other lesions in the brain to 





 suggest this is the diagnosis.  This is an unusual place for an isolated brain 





 metastasis.  Trev Stark MD  FACR


 


Head CT 6/2/17 0918 Signed





Impressions: 





 Service Date/Time:  Friday, June 2, 2017 09:32 - CONCLUSION:  1. 1 cm rounded 





 hyperdensity in the right para midline frontal region just anterior to the 





 anterior horns of the lateral ventricles. 2. Differential diagnosis is 





 extra-axial mass such as meningioma versus small cortical parenchymal 

hemorrhage 





 or small extra axial hemorrhage. Recommend MRI brain with and without contrast 





 for further evaluation.     Mariusz Sadler MD 


 


Chest X-Ray 6/2/17 0000 Signed





Impressions: 





 Service Date/Time:  Friday, June 2, 2017 14:24 - CONCLUSION:  1. Hypoinflation 





 with no acute infiltrate. 2. Borderline prominent but well compensated heart. 

   





  Harry Mar MD 


 


Brain MRI 6/2/17 0000 Signed





Impressions: 





 Service Date/Time:  Friday, June 2, 2017 11:45 - CONCLUSION:  1. Area of 





 increased density on CT corresponds to a 8-9 mm nonenhancing lesion in the 

genu 





 of the corpus callosum which does appear to contain a central vessel. This 

same 





 lesion shows a ring of hemosiderin on the axial T2-weighted images with marked 





 blooming artifact on the susceptibility weighted images characteristic of 





 regional blood. Imaging characteristics are overtly benign and I would 

consider 





 entities such as a cavernous angioma or other small vascular malformation.  I 





 believe a low-grade neoplasm or aneurysm is much less likely but I would 





 recommend a CTA of the head to exclude the latter. 2. Otherwise, mild, 

scattered 





 white matter changes.     Harry Mar MD 








Objective Remarks


Narrative


GENERAL:  male sitting up in a chair not in any acute distress.


SKIN: Warm and dry.


HEAD: Atraumatic. Normocephalic. 


EYES: No scleral icterus. No injection or drainage. Normal ROM with conjugate 

gaze. PERRL.


ENT: No nasal bleeding or discharge.  Mucous membranes pink and moist.


NECK: Trachea midline. No JVD. 


CARDIOVASCULAR: Regular rate and rhythm.  


RESPIRATORY: No accessory muscle use. Clear to auscultation. Breath sounds 

equal bilaterally. 


GASTROINTESTINAL: Abdomen soft, non-tender, nondistended. Hepatic and splenic 

margins not palpable. 


MUSCULOSKELETAL: Extremities without clubbing, cyanosis, or edema. No obvious 

deformities. 


NEUROLOGICAL: Awake and alert. No obvious cranial nerve deficits.  Motor 

grossly within normal limits. Five out of 5 muscle strength in the arms and 

legs.  Normal speech.


PSYCHIATRIC: Appropriate mood and affect; insight and judgment normal.





A/P


Assessment and Plan


47-year-old male with:


Hypertensive emergency


Intracranial lesion in the frontal area (mass)


Diabetes mellitus


Left sided sciatica





Plan:


Neuro: Neurosurgery has been consulted and patient has been evaluated by Dr. Maldonado.  Further management for intracranial lesion per Dr. Maldonado.  Continue 

neuro checks.  Discussed with neurosurgery Dr. Maldonado.  He wants a repeat MRI 

brain in 3 months, no intervention at this time.  Ordered physical therapy for 

back strengthening exercises for left-sided sciatica and low back pain.


Cardiovascular: Off nicardipine drip.  Labetalol when necessary added.  Resumed 

by mouth lisinopril and add Norvasc 10 mg by mouth daily for better blood 

pressure control.  Lisinopril dose increased to 40 mg daily on 6/4.


Pulmonary: Supplemental O2 as needed.  Bronchodilators when necessary


GI/liver: Advance by mouth diet


Renal/: Follow intake output, monitor and replete electro lites, follow BUN/

creatinine.  IV hydration


ID: No indication for antibiotics at this time.


Heme-onc: Dr. Leonardo from heme on consulted by Dr. Maldonado to further evaluate 

intracranial mass.  CT chest abdomen pelvis unremarkable.


Endocrine: SSI for glycemic control.  Hold metformin in view of IV contrast 

exposure.  Resume metformin on discharge


Prophylaxis: PPI/SCDs.  Subcutaneous heparin when okay with neurosurgery


Discussed with Dr. Maldonado, discussed with Dr. Leonardo.


Patient will be transferred out of ICU.  Probable discharge tomorrow.


Consult and transfer to hospitalist service for further medical management.  

Critical care signing off at this time, please reconsult if needed.








Dandy Hills MD Jun 4, 2017 11:01

## 2017-06-05 VITALS
HEART RATE: 72 BPM | OXYGEN SATURATION: 98 % | SYSTOLIC BLOOD PRESSURE: 165 MMHG | TEMPERATURE: 98 F | RESPIRATION RATE: 18 BRPM | DIASTOLIC BLOOD PRESSURE: 80 MMHG

## 2017-06-05 VITALS
SYSTOLIC BLOOD PRESSURE: 154 MMHG | RESPIRATION RATE: 19 BRPM | TEMPERATURE: 98 F | HEART RATE: 75 BPM | OXYGEN SATURATION: 98 % | DIASTOLIC BLOOD PRESSURE: 81 MMHG

## 2017-06-05 VITALS
RESPIRATION RATE: 20 BRPM | HEART RATE: 79 BPM | DIASTOLIC BLOOD PRESSURE: 71 MMHG | TEMPERATURE: 97.4 F | SYSTOLIC BLOOD PRESSURE: 152 MMHG | OXYGEN SATURATION: 96 %

## 2017-06-05 VITALS
OXYGEN SATURATION: 100 % | TEMPERATURE: 97.9 F | DIASTOLIC BLOOD PRESSURE: 72 MMHG | RESPIRATION RATE: 18 BRPM | HEART RATE: 78 BPM | SYSTOLIC BLOOD PRESSURE: 136 MMHG

## 2017-06-05 VITALS
OXYGEN SATURATION: 99 % | DIASTOLIC BLOOD PRESSURE: 78 MMHG | HEART RATE: 67 BPM | TEMPERATURE: 97.1 F | RESPIRATION RATE: 18 BRPM | SYSTOLIC BLOOD PRESSURE: 155 MMHG

## 2017-06-05 VITALS
HEART RATE: 65 BPM | SYSTOLIC BLOOD PRESSURE: 168 MMHG | TEMPERATURE: 97.7 F | DIASTOLIC BLOOD PRESSURE: 81 MMHG | OXYGEN SATURATION: 97 % | RESPIRATION RATE: 18 BRPM

## 2017-06-05 RX ADMIN — INSULIN ASPART SCH: 100 INJECTION, SOLUTION INTRAVENOUS; SUBCUTANEOUS at 00:00

## 2017-06-05 RX ADMIN — INSULIN ASPART SCH: 100 INJECTION, SOLUTION INTRAVENOUS; SUBCUTANEOUS at 21:41

## 2017-06-05 RX ADMIN — INSULIN ASPART SCH: 100 INJECTION, SOLUTION INTRAVENOUS; SUBCUTANEOUS at 12:00

## 2017-06-05 RX ADMIN — CHLORHEXIDINE GLUCONATE SCH PACK: 500 CLOTH TOPICAL at 04:00

## 2017-06-05 RX ADMIN — STANDARDIZED SENNA CONCENTRATE AND DOCUSATE SODIUM SCH TAB: 8.6; 5 TABLET, FILM COATED ORAL at 21:00

## 2017-06-05 RX ADMIN — STANDARDIZED SENNA CONCENTRATE AND DOCUSATE SODIUM SCH TAB: 8.6; 5 TABLET, FILM COATED ORAL at 09:00

## 2017-06-05 RX ADMIN — LISINOPRIL SCH MG: 20 TABLET ORAL at 21:21

## 2017-06-05 RX ADMIN — PANTOPRAZOLE SODIUM SCH MG: 40 INJECTION, POWDER, FOR SOLUTION INTRAVENOUS at 09:10

## 2017-06-05 RX ADMIN — Medication SCH ML: at 21:22

## 2017-06-05 RX ADMIN — Medication SCH ML: at 09:10

## 2017-06-05 RX ADMIN — INSULIN ASPART SCH: 100 INJECTION, SOLUTION INTRAVENOUS; SUBCUTANEOUS at 17:03

## 2017-06-05 RX ADMIN — INSULIN ASPART SCH: 100 INJECTION, SOLUTION INTRAVENOUS; SUBCUTANEOUS at 06:00

## 2017-06-05 RX ADMIN — HYDROCHLOROTHIAZIDE SCH MG: 25 TABLET ORAL at 09:15

## 2017-06-05 NOTE — HHI.PR
Subjective


Remarks


No new complaints. 


Pt reports that his headache has improved. 


Blood pressure is still running high with systolic in the 150-160s





Objective


Vitals





 Vital Signs








  Date Time  Temp Pulse Resp B/P Pulse Ox O2 Delivery O2 Flow Rate FiO2


 


6/5/17 12:32 97.1 67 18 155/78 99   


 


6/5/17 08:56        21


 


6/5/17 08:00 97.7 65 18 168/81 97   


 


6/5/17 05:45 98.0 75 19 154/81 98   


 


6/5/17 00:30 98.0 72 18 165/80 98   


 


6/4/17 21:15 98.3 75 16 156/75 98   


 


6/4/17 16:00 98.3 98 31 178/92 100   


 


6/4/17 16:00  98      


 


6/4/17 14:00  92      














 6/4/17 6/4/17 6/5/17





 15:00 23:00 07:00


 


Intake Total 840 ml 1040 ml 800 ml


 


Output Total 575 ml  


 


Balance 265 ml 1040 ml 800 ml


 


   


 


Intake Oral 840 ml 1040 ml 800 ml


 


Output Urine Total 575 ml  


 


# Voids 5 1 2


 


# Bowel Movements 2 1 0








Result Diagram:  


6/3/17 0520                                                                    

            6/3/17 0520





Imaging





Last Impressions








Head CT 6/3/17 0000 Signed





Impressions: 





 Service Date/Time:  Saturday, Renetta 3, 2017 14:54 - CONCLUSION: No significant 





 change. Oval hyperdensity again seen in the region of the corpus callosum 





 anteriorly.     Mariusz Sadler MD 


 


Chest CT 6/3/17 0000 Signed





Impressions: 





 Service Date/Time:  Saturday, Renetta 3, 2017 14:59 - CONCLUSION:  No acute 





 findings in the chest.     Mariusz Sadler MD 


 


Abdomen/Pelvis CT 6/3/17 0000 Signed





Impressions: 





 Service Date/Time:  Saturday, Renetta 3, 2017 14:59 - CONCLUSION:  Hepatic 





 steatosis. No acute findings in the abdomen and pelvis.     Mariusz Sadler MD 


 


Head CTA 6/2/17 1316 Signed





Impressions: 





 Service Date/Time:  Friday, June 2, 2017 14:17 - CONCLUSION: Negative CTA of 

the 





 brain.  Enhancing lesions involve the corpus callosum, dense white matter 





 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  





 MS can give similar appearance but there are no other lesions in the brain to 





 suggest this is the diagnosis.  This is an unusual place for an isolated brain 





 metastasis.  Trev Stark MD  FACR


 


Neck CTA 6/2/17 0000 Signed





Impressions: 





 Service Date/Time:  Friday, June 2, 2017 14:17 - CONCLUSION:  1. Very mild 





 calcified plaque involving the origin of the internal carotid arteries 





 bilaterally resulting in mild, less than 50%%, stenosis. 2. Otherwise, 





 unremarkable CTA examination of the neck.    Elkin Rodriguez MD 


 


Chest X-Ray 6/2/17 0000 Signed





Impressions: 





 Service Date/Time:  Friday, June 2, 2017 14:24 - CONCLUSION:  1. Hypoinflation 





 with no acute infiltrate. 2. Borderline prominent but well compensated heart. 

   





  Harry Mar MD 


 


Brain MRI 6/2/17 0000 Signed





Impressions: 





 Service Date/Time:  Friday, June 2, 2017 11:45 - CONCLUSION:  1. Area of 





 increased density on CT corresponds to a 8-9 mm nonenhancing lesion in the 

genu 





 of the corpus callosum which does appear to contain a central vessel. This 

same 





 lesion shows a ring of hemosiderin on the axial T2-weighted images with marked 





 blooming artifact on the susceptibility weighted images characteristic of 





 regional blood. Imaging characteristics are overtly benign and I would 

consider 





 entities such as a cavernous angioma or other small vascular malformation.  I 





 believe a low-grade neoplasm or aneurysm is much less likely but I would 





 recommend a CTA of the head to exclude the latter. 2. Otherwise, mild, 

scattered 





 white matter changes.     Harry Mar MD 








Objective Remarks


General: NAD, AAOx3


Chest: CTA bilaterally


Cardiac: Regular


Abd: +BS, soft ND/NT


Ext: No edema





A/P


Problem List:  


(1) Headache


Status:  Acute


Plan:  


- Pt is a 46 y/o male who was admitted on 6/2/17 with complaints of acute right 

forehead cephalgia.


- The pt had a systolic blood pressure in the 230s in the ER and was initiated 

on a nicardipine drip. 


- Pt was admitted to [facility] with Neurosurgery following. 


- Head CT (6/2/17) --> Hyperdense lesion in the frontal region pallor midline 

anterior to anterior horns 


 of the lateral ventricles.  


- MRI brain (6/2/17) --> Area of increased density on CT corresponds to a 8-9 

mm nonenhancing lesion in the genu 


 of the corpus callosum which does appear to contain a central vessel. This 

same lesion shows a ring of hemosiderin 


 on the axial T2-weighted images with marked blooming artifact on the 

susceptibility weighted images characteristic of 


 regional blood. Imaging characteristics are overtly benign and I would 

consider entities such as a cavernous angioma


 or other small vascular malformation.  


- CTA Brain (6/2) --> Negative CTA of the brain.  Enhancing lesions involve the 

corpus callosum, dense white matter 


 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 

MS can give similar appearance but


 there are no other lesions in the brain to suggest this is the diagnosis.  

This is an unusual place for an isolated brain 


 metastasis. 


- Oncology was consulted. 


- CT Chest/Abd/pelvis with no evidence to suggest a primary malignancy in the 

abdomen or pelvis or thorax.


- Pt is recommended to repeat MRI w/wo contrast in 2-3 months. The lesion would 

be dangerous to biopsy as it is in a 


central location within the brain.





- Pt was able to be taken off nicardipine drip on 6/3 and has maintained 

systolic blood pressure in the 150-160s.  


- Pt is on Lisinopril 40mg po daily, Norvasc 10mg po daily, HCTZ 25mg po daily. 


- We will change the Lisinopril to 20mg po BID and stop the Norvasc and start 

Procardia XL 30mg po daily


- Monitor BP and possible d/c tomorrow if BP is stable. 





(2) Hypertension


Status:  Chronic


Plan:  


- See above. 





(3) Intracranial mass


Status:  Acute


Plan:  


- See above. 





(4) Diabetes mellitus type 2, noninsulin dependent


Status:  Chronic


Plan:  


- NovoLog SSI


- Diabetic diet





Assessment and Plan





Patient examined.


Assessment and plan formulated with Darlene Levy PA-C.


I agree with the above.





Problem Qualifiers





(1) Headache:  


Qualified Code:  R51 - Nonintractable headache, unspecified chronicity pattern, 

unspecified headache type


(2) Hypertension:  


Qualified Code:  I15.9 - Secondary hypertension





Darlene Levy Jun 5, 2017 14:08


Benoit Jenkins DO Jun 6, 2017 15:20

## 2017-06-05 NOTE — HHI.NSPN
__________________________________________________ (Cira Pozo)





Note Status


Status:  Progress Note (Cira Pozo)





Interval History


Interval History


Mr. Norwood is a 47-year-old male who presents to [facility] ED today with 

complaints of headaches.  The history was obtained with the aid of his wife at 

bedside as the patient received pain medication and is drowsy.  His wife 

reports the patient had complained of headaches located behind the right eye 

this morning.  It was severe enough that he presented to the ED for further 

evaluation.  His wife reports he has had headaches previously that comes and 

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  An MRI of brain was obtained 

which showed a mass in the right paramidline frontal possible vascular 

malformation versus cavernous angioma.  The patient denies focal weakness, 

double vision, seizures, nausea, vomiting.  He reports no personal history of 

aneurysm, tumors or cancers.  A neurosurgical evaluation was requested. 





6/3: headaches resolved, denies focal weakness, vomiting, seizures, vision 

changes


6/4: stable overnight, Oncology evaluation noted. 


6/5: no new neurological complaints.  (Cira Pozo)





Labs, Micro, & Vital Signs


Results











  Date Time  Temp Pulse Resp B/P Pulse Ox O2 Delivery O2 Flow Rate FiO2


 


6/5/17 12:32 97.1 67 18 155/78 99   


 


6/5/17 08:56        21


 


6/5/17 08:00 97.7 65 18 168/81 97   


 


6/5/17 05:45 98.0 75 19 154/81 98   


 


6/5/17 00:30 98.0 72 18 165/80 98   


 


6/4/17 21:15 98.3 75 16 156/75 98   


 


6/4/17 16:00 98.3 98 31 178/92 100   


 


6/4/17 16:00  98      














 6/5/17





 07:00


 


Intake Total 2680 ml


 


Output Total 575 ml


 


Balance 2105 ml








Constitutional





Vital Signs








  Date Time  Temp Pulse Resp B/P Pulse Ox O2 Delivery O2 Flow Rate FiO2


 


6/5/17 12:32 97.1 67 18 155/78 99   


 


6/5/17 08:56        21


 


6/5/17 08:00 97.7 65 18 168/81 97   


 


6/5/17 05:45 98.0 75 19 154/81 98   


 


6/5/17 00:30 98.0 72 18 165/80 98   


 


6/4/17 21:15 98.3 75 16 156/75 98   


 


6/4/17 16:00 98.3 98 31 178/92 100   


 


6/4/17 16:00  98      














 6/5/17





 07:00


 


Intake Total 2680 ml


 


Output Total 575 ml


 


Balance 2105 ml





 (Cira Pozo)





Review of Systems/Exam


Exam


Mr. Norwood is alert, oriented to time, place and person. Speech is fluent.  

Follows commands well.  





Sitting up in chair. 





Cranial nerve examination: pupils 2-3 equal, round, and reactive to light. Extra

-ocular movements are intact. Facial motor and sensory function are normal and 

symmetrical. 





Neck is soft and supple.   No meningismus or nuchal rigidity. 





Motor: moves both upper and lower extremities symmetrically





Sensory examination is intact to light touch in both the upper and lower 

extremities, symmetrically.  





Cerebellar examination is intact to finger-to-nose test  (Cira Pozo)





Medications


Current Medications





Current Medications








 Medications


  (Trade)  Dose


 Ordered  Sig/Collin


 Route


 PRN Reason  Start Time


 Stop Time Status Last Admin


Dose Admin


 


 Sodium Chloride


  (NS Flush)  2 ml  UNSCH  PRN


 IV FLUSH


 FLUSH AFTER USING IV ACCESS  6/2/17 14:00


     


 


 


 Sodium Chloride


  (NS Flush)  2 ml  BID


 IV FLUSH


   6/2/17 21:00


    6/5/17 09:10


 


 


 Acetaminophen


  (Tylenol)  650 mg  Q6H  PRN


 PO


 PAIN 1-10 AND/OR FEVER >101F  6/2/17 14:00


    6/3/17 19:47


 


 


 Ondansetron HCl


  (Zofran Inj)  4 mg  Q6H  PRN


 IV


 NAUSEA OR VOMITING  6/2/17 14:00


     


 


 


 Miscellaneous


 Information  1  Q361D


 XX


   6/2/17 14:00


    6/2/17 14:00


 


 


 Chlorhexidine


 Gluconate


  (Chlorhexidine


 2% Cloth)  3 pack


 Taper  DAILY@04


 TOP


   6/3/17 04:00


 5/30/18 03:59  6/4/17 03:50


 


 


 Chlorhexidine


 Gluconate


  (Chlorhexidine


 2% Cloth)  3 pack  UNSCH  PRN


 [facility]


 HYGIENIC CARE  6/2/17 14:00


     


 


 


 Senna/Docusate


 Sodium


  (Kaitlyn-Colace)  1 tab  BID


 PO


   6/2/17 21:00


     


 


 


 Magnesium


 Hydroxide


  (Milk Of


 Magnesia Liq)  30 ml  Q12H  PRN


 PO


 MILD - MODERATE CONSTIPATION  6/2/17 14:00


     


 


 


 Sennosides


  (Senokot)  17.2 mg  Q12H  PRN


 PO


 MODERATE - SEVERE CONSTIPATION  6/2/17 14:00


     


 


 


 Bisacodyl


  (Dulcolax Supp)  10 mg  DAILY  PRN


 RECTAL


 SEVERE CONSITIPATION  6/2/17 14:00


     


 


 


 Lactulose


  (Lactulose Liq)  30 ml  DAILY  PRN


 PO


 SEVERE CONSITIPATION  6/2/17 14:00


     


 


 


 Insulin Aspart


  (NovoLOG


 SUPPLEMENTAL


 SCALE)  1  Q6HR


 SQ


   6/2/17 18:00


    6/5/17 12:00


 


 


 Dextrose


  (D50w (Vial) Inj)  25 ml  UNSCH  PRN


 IV


 HYPOGLYCEMIA-SEE COMMENTS  6/2/17 14:15


     


 


 


 Glucagon


  (Glucagon Inj)  1 mg  UNSCH  PRN


 IM/SQ


 HYPOGLYCEMIA-SEE COMMENTS  6/2/17 14:15


     


 


 


 Hydrochlorothiazide


  (Hydrodiuril)  25 mg  DAILY


 PO


   6/2/17 18:00


    6/5/17 09:15


 


 


 Pantoprazole


 Sodium


  (Protonix)  40 mg  DAILY


 PO


   6/6/17 09:00


     


 


 


 Lisinopril


  (Prinivil)  20 mg  BID


 PO


   6/5/17 21:00


     


 


 


 Nifedipine


  (Procardia Xl)  30 mg  DAILY


 PO


   6/6/17 09:00


     


 





 (Cira Pozo)





Medical Decision Making


MDM Remarks


48 y/o male presented with intractable headaches, improved


MRI Brain showed right fontal paramidline mass, poss venous malformation or 

hemangioma


CTA Head reports no aneurysm, but possible CNS lymphoma, glioblastoma (Cira Pozo)





Plan


Plan Remarks


discussed with patient treatment options of brain biopsy vs watching with 

repeat MRI in 3 months


pt requests nonsurgical mgt for now


repeat MRI Brain w/wo contrast in 3 months and f/u outpatient


dw patient again treatment plan, he understands and questions answered


clear for dc from NRS standpoint when blood pressure improves


cont mgt per hospitalist


f/u outpatient (Cira Pozo)





Attending Statement


The exam, history, and the medical decision-making described in the above note 

were completed with the assistance of the mid-level provider. I reviewed and 

agree with the findings presented.  I attest that I had a face-to-face 

encounter with the patient on the same day, and personally performed and 

documented my assessment and findings in the medical record. (Hebert Maldonado MD)








Cira Pozo Jun 5, 2017 15:27


Hebert Maldonado MD Jun 11, 2017 19:14

## 2017-06-06 VITALS
OXYGEN SATURATION: 97 % | HEART RATE: 71 BPM | TEMPERATURE: 96.4 F | SYSTOLIC BLOOD PRESSURE: 147 MMHG | DIASTOLIC BLOOD PRESSURE: 59 MMHG | RESPIRATION RATE: 20 BRPM

## 2017-06-06 VITALS
SYSTOLIC BLOOD PRESSURE: 156 MMHG | TEMPERATURE: 96.9 F | OXYGEN SATURATION: 100 % | DIASTOLIC BLOOD PRESSURE: 91 MMHG | HEART RATE: 69 BPM | RESPIRATION RATE: 18 BRPM

## 2017-06-06 VITALS
DIASTOLIC BLOOD PRESSURE: 58 MMHG | SYSTOLIC BLOOD PRESSURE: 125 MMHG | OXYGEN SATURATION: 100 % | HEART RATE: 65 BPM | TEMPERATURE: 96.9 F | RESPIRATION RATE: 18 BRPM

## 2017-06-06 VITALS
OXYGEN SATURATION: 99 % | RESPIRATION RATE: 20 BRPM | SYSTOLIC BLOOD PRESSURE: 137 MMHG | HEART RATE: 73 BPM | DIASTOLIC BLOOD PRESSURE: 65 MMHG | TEMPERATURE: 96.9 F

## 2017-06-06 RX ADMIN — LISINOPRIL SCH MG: 20 TABLET ORAL at 08:59

## 2017-06-06 RX ADMIN — Medication SCH ML: at 09:03

## 2017-06-06 RX ADMIN — HYDROCHLOROTHIAZIDE SCH MG: 25 TABLET ORAL at 08:58

## 2017-06-06 RX ADMIN — INSULIN ASPART SCH: 100 INJECTION, SOLUTION INTRAVENOUS; SUBCUTANEOUS at 06:31

## 2017-06-06 RX ADMIN — STANDARDIZED SENNA CONCENTRATE AND DOCUSATE SODIUM SCH TAB: 8.6; 5 TABLET, FILM COATED ORAL at 09:00

## 2017-06-06 RX ADMIN — CHLORHEXIDINE GLUCONATE SCH PACK: 500 CLOTH TOPICAL at 00:36

## 2017-06-06 RX ADMIN — INSULIN ASPART SCH: 100 INJECTION, SOLUTION INTRAVENOUS; SUBCUTANEOUS at 12:00

## 2017-06-06 NOTE — HHI.DS
Discharge Summary


Admission Date


Jun 2, 2017 at 13:51


Discharge Date:  Jun 6, 2017


Admitting Diagnosis


Poss Intracranial Aneurysm, HTN, HA





(1) Headache


Diagnosis:  Principal





(2) Hypertension


Diagnosis:  Principal





(3) Intracranial mass


Diagnosis:  Principal





(4) Diabetes mellitus type 2, noninsulin dependent


Diagnosis:  Secondary





Consultants


Dr. Maldonado, Neurosurgery


Dr. Den Leonardo, Oncology


Brief History





48 yo M c/o R forehead cephalgia, gradual onset over 3 hours or so prior to 

coming to ER. It started while the patient was moving boxes for work. This 

morning he had 2 bowel movements, the prior was loose, as such the patient had 

no appetite, did not eat breakfast and did not take his lisinopril as he 

normally would every morning. HTN upon arrival noted. No LOC, numbness/tingling/

weakness. No neck stiffness. HA quality is throbbing and of moderate severity.  

Patient had a systolic blood pressure in the 230s in the ER.  He was initiated 

on a nicardipine drip.  Head CT revealed hyperdense lesion in the frontal 

region pallor midline anterior to anterior horns of the lateral ventricles.  

MRI brain and CTA ordered for further evaluation and neurosurgery consult 

requested.  Dr. Maldonado.  Patient was accepted for admission by critical care 

medicine service.  When I evaluated the patient in the ER he had just finished 

his MRI and CTA and was drowsy from Ativan and Benadryl which she had received 

earlier prior to imaging.  He knew he was at the hospital and was following 

commands appropriately.  He still had a headache that this was slightly better.

  He was on a nicardipine drip at that time at 15 mg/h with systolic blood 

pressure 160s.  He denied any history of similar symptoms previously and stated 

that he is a known diabetic and hypertensive and is usually compliant with his 

meds and has a well-controlled blood pressure and fingerstick glucoses runs 

around 120s to 140s on metformin.


CBC/BMP:  


6/3/17 0520                                                                    

            6/3/17 0520





Imaging





Last Impressions








Head CT 6/3/17 0000 Signed





Impressions: 





 Service Date/Time:  Saturday, Renetta 3, 2017 14:54 - CONCLUSION: No significant 





 change. Oval hyperdensity again seen in the region of the corpus callosum 





 anteriorly.     Mariusz Sadler MD 


 


Chest CT 6/3/17 0000 Signed





Impressions: 





 Service Date/Time:  Saturday, Renetta 3, 2017 14:59 - CONCLUSION:  No acute 





 findings in the chest.     Mariusz Sadler MD 


 


Abdomen/Pelvis CT 6/3/17 0000 Signed





Impressions: 





 Service Date/Time:  Saturday, Renetta 3, 2017 14:59 - CONCLUSION:  Hepatic 





 steatosis. No acute findings in the abdomen and pelvis.     Mariusz Salder MD 


 


Head CTA 6/2/17 1316 Signed





Impressions: 





 Service Date/Time:  Friday, June 2, 2017 14:17 - CONCLUSION: Negative CTA of 

the 





 brain.  Enhancing lesions involve the corpus callosum, dense white matter 





 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  





 MS can give similar appearance but there are no other lesions in the brain to 





 suggest this is the diagnosis.  This is an unusual place for an isolated brain 





 metastasis.  Trev Stark MD  FACR


 


Neck CTA 6/2/17 0000 Signed





Impressions: 





 Service Date/Time:  Friday, June 2, 2017 14:17 - CONCLUSION:  1. Very mild 





 calcified plaque involving the origin of the internal carotid arteries 





 bilaterally resulting in mild, less than 50%%, stenosis. 2. Otherwise, 





 unremarkable CTA examination of the neck.    Elkin Rodriguez MD 


 


Chest X-Ray 6/2/17 0000 Signed





Impressions: 





 Service Date/Time:  Friday, June 2, 2017 14:24 - CONCLUSION:  1. Hypoinflation 





 with no acute infiltrate. 2. Borderline prominent but well compensated heart. 

   





  Harry Mar MD 


 


Brain MRI 6/2/17 0000 Signed





Impressions: 





 Service Date/Time:  Friday, June 2, 2017 11:45 - CONCLUSION:  1. Area of 





 increased density on CT corresponds to a 8-9 mm nonenhancing lesion in the 

genu 





 of the corpus callosum which does appear to contain a central vessel. This 

same 





 lesion shows a ring of hemosiderin on the axial T2-weighted images with marked 





 blooming artifact on the susceptibility weighted images characteristic of 





 regional blood. Imaging characteristics are overtly benign and I would 

consider 





 entities such as a cavernous angioma or other small vascular malformation.  I 





 believe a low-grade neoplasm or aneurysm is much less likely but I would 





 recommend a CTA of the head to exclude the latter. 2. Otherwise, mild, 

scattered 





 white matter changes.     Harry Mar MD 








PE at Discharge


General: NAD, AAOx3


Chest: CTA bilaterally


Cardiac: Regular


Abd: +BS, soft ND/NT


Ext: No edema


Hospital Course


(1) Headache


Status:  Acute


Plan:  


- Pt is a 48 y/o male who was admitted on 6/2/17 with complaints of acute right 

forehead cephalgia.


- The pt had a systolic blood pressure in the 230s in the ER and was initiated 

on a nicardipine drip. 


- Pt was admitted to [facility] with Neurosurgery following. 


- Head CT (6/2/17) --> Hyperdense lesion in the frontal region pallor midline 

anterior to anterior horns 


 of the lateral ventricles.  


- MRI brain (6/2/17) --> Area of increased density on CT corresponds to a 8-9 

mm nonenhancing lesion in the genu 


 of the corpus callosum which does appear to contain a central vessel. This 

same lesion shows a ring of hemosiderin 


 on the axial T2-weighted images with marked blooming artifact on the 

susceptibility weighted images characteristic of 


 regional blood. Imaging characteristics are overtly benign and I would 

consider entities such as a cavernous angioma


 or other small vascular malformation.  


- CTA Brain (6/2) --> Negative CTA of the brain.  Enhancing lesions involve the 

corpus callosum, dense white matter 


 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 

MS can give similar appearance but


 there are no other lesions in the brain to suggest this is the diagnosis.  

This is an unusual place for an isolated brain 


 metastasis. 


- Oncology was consulted. 


- CT Chest/Abd/pelvis with no evidence to suggest a primary malignancy in the 

abdomen or pelvis or thorax.


- Pt is recommended to repeat MRI w/wo contrast in 2-3 months. The lesion would 

be dangerous to biopsy as it is in a 


central location within the brain.





- Pt was able to be taken off nicardipine drip on 6/3 and has maintained 

systolic blood pressure in the 150-160s.  


- Pt is on Lisinopril 40mg po daily, Norvasc 10mg po daily, HCTZ 25mg po daily. 


- We will change the Lisinopril to 20mg po BID and stop the Norvasc and start 

Procardia XL 30mg po daily


- Blood pressure reviewed on the day of discharge and overall improved with 

bedside SBP of 147


- will discharge to home on above new regimen 


- Pt to maintain low salt diet, increase exercise, and work on weight loss. 


- f/u with PCP, Dr. Hughes, in 1 week


- f/u with Neurosurgery, Dr. Maldonado, in 3 months


- Pt will need repeat MRI brain in 3 months


- see discharge orders





(2) Hypertension


Status:  Chronic


Plan:  


- See above. 





(3) Intracranial mass


Status:  Acute


Plan:  


- See above. 





(4) Diabetes mellitus type 2, noninsulin dependent


Status:  Chronic


Plan:  


- NovoLog SSI


- Diabetic diet


- resume outpt dose of metformin upon discharge


Pt Condition on Discharge:  Stable


Discharge Disposition:  Discharge Home


Discharge Instructions


DIET: Follow Instructions for:  Heart Healthy Diet


Activities you can perform:  Regular-No Restrictions


Follow up Referrals:  


Neurosurgery - 3 Months @ Neurosurgical - Dr Maldonado


PCP Follow-up - 1 Week with Dr. Damaris Hughes





New Medications:  


 ([Lisinopril]) 20 MG TAB


20 MG PO BID HTN #60 Ref 0 TAB


 ([NIFEdipine SR]) 30 MG TABCR


30 MG PO DAILY HTN #30 Ref 0 TAB.SR


 


Continued Medications:  


Hydrochlorothiazide (Hydrochlorothiazide) 25 Mg Tab


25 MG PO DAILY #30 Ref 0 TAB


Metformin (Metformin) 500 Mg Tab


500 MG PO BIDPC With meals Blood Sugar Management #60 Ref 0 TAB


 


Discontinued Medications:  


Lisinopril (Lisinopril) 20 Mg Tab


20 MG PO DAILY #30 Ref 0 TAB











Benoit Jenkins DO Jun 6, 2017 15:24

## 2017-06-06 NOTE — HHI.DCPOC
Discharge Care Plan


Diagnosis:  


(1) Intracranial mass


(2) Headache


(3) Hypertension


(4) Diabetes mellitus type 2, noninsulin dependent


Goals to Promote Your Health


* To prevent worsening of your condition and complications


* To maintain your health at the optimal level


Directions to Meet Your Goals


*** Take your medications as prescribed


*** Follow your dietary instruction


*** Follow activity as directed








*** Keep your appointments as scheduled


*** Take your immunizations and boosters as scheduled


*** If your symptoms worsen call your PCP, if no PCP go to Urgent Care Center 

or Emergency Room***


*** Smoking is Dangerous to Your Health. Avoid second hand smoke***


***Call the 24-hour hour crisis hotline for domestic abuse at 1-419.395.8861***








Benoit Jenkins DO Jun 6, 2017 15:19

## 2018-01-01 ENCOUNTER — HOSPITAL ENCOUNTER (INPATIENT)
Dept: HOSPITAL 17 - NEPE | Age: 48
LOS: 5 days | Discharge: HOME | DRG: 305 | End: 2018-01-06
Attending: HOSPITALIST | Admitting: FAMILY MEDICINE
Payer: COMMERCIAL

## 2018-01-01 VITALS
DIASTOLIC BLOOD PRESSURE: 111 MMHG | HEART RATE: 70 BPM | SYSTOLIC BLOOD PRESSURE: 250 MMHG | OXYGEN SATURATION: 99 % | RESPIRATION RATE: 19 BRPM

## 2018-01-01 VITALS
SYSTOLIC BLOOD PRESSURE: 219 MMHG | HEART RATE: 73 BPM | OXYGEN SATURATION: 98 % | RESPIRATION RATE: 16 BRPM | TEMPERATURE: 98.4 F | DIASTOLIC BLOOD PRESSURE: 105 MMHG

## 2018-01-01 VITALS — HEIGHT: 66 IN | WEIGHT: 277.78 LBS | BODY MASS INDEX: 44.64 KG/M2

## 2018-01-01 DIAGNOSIS — E11.9: ICD-10-CM

## 2018-01-01 DIAGNOSIS — M75.81: ICD-10-CM

## 2018-01-01 DIAGNOSIS — Z79.84: ICD-10-CM

## 2018-01-01 DIAGNOSIS — I16.0: Primary | ICD-10-CM

## 2018-01-01 DIAGNOSIS — M62.838: ICD-10-CM

## 2018-01-01 DIAGNOSIS — M25.511: ICD-10-CM

## 2018-01-01 DIAGNOSIS — G93.89: ICD-10-CM

## 2018-01-01 DIAGNOSIS — R07.89: ICD-10-CM

## 2018-01-01 DIAGNOSIS — R10.9: ICD-10-CM

## 2018-01-01 DIAGNOSIS — Z91.14: ICD-10-CM

## 2018-01-01 DIAGNOSIS — N17.9: ICD-10-CM

## 2018-01-01 DIAGNOSIS — Z91.19: ICD-10-CM

## 2018-01-01 PROCEDURE — 80076 HEPATIC FUNCTION PANEL: CPT

## 2018-01-01 PROCEDURE — A9579 GAD-BASE MR CONTRAST NOS,1ML: HCPCS

## 2018-01-01 PROCEDURE — 85610 PROTHROMBIN TIME: CPT

## 2018-01-01 PROCEDURE — 80053 COMPREHEN METABOLIC PANEL: CPT

## 2018-01-01 PROCEDURE — 93005 ELECTROCARDIOGRAM TRACING: CPT

## 2018-01-01 PROCEDURE — 96375 TX/PRO/DX INJ NEW DRUG ADDON: CPT

## 2018-01-01 PROCEDURE — 81001 URINALYSIS AUTO W/SCOPE: CPT

## 2018-01-01 PROCEDURE — 82552 ASSAY OF CPK IN BLOOD: CPT

## 2018-01-01 PROCEDURE — 74177 CT ABD & PELVIS W/CONTRAST: CPT

## 2018-01-01 PROCEDURE — 85025 COMPLETE CBC W/AUTO DIFF WBC: CPT

## 2018-01-01 PROCEDURE — 70553 MRI BRAIN STEM W/O & W/DYE: CPT

## 2018-01-01 PROCEDURE — 85027 COMPLETE CBC AUTOMATED: CPT

## 2018-01-01 PROCEDURE — 84484 ASSAY OF TROPONIN QUANT: CPT

## 2018-01-01 PROCEDURE — 85730 THROMBOPLASTIN TIME PARTIAL: CPT

## 2018-01-01 PROCEDURE — 70544 MR ANGIOGRAPHY HEAD W/O DYE: CPT

## 2018-01-01 PROCEDURE — 86140 C-REACTIVE PROTEIN: CPT

## 2018-01-01 PROCEDURE — 83735 ASSAY OF MAGNESIUM: CPT

## 2018-01-01 PROCEDURE — 82550 ASSAY OF CK (CPK): CPT

## 2018-01-01 PROCEDURE — 82948 REAGENT STRIP/BLOOD GLUCOSE: CPT

## 2018-01-01 PROCEDURE — 96374 THER/PROPH/DIAG INJ IV PUSH: CPT

## 2018-01-01 PROCEDURE — 83880 ASSAY OF NATRIURETIC PEPTIDE: CPT

## 2018-01-01 PROCEDURE — 76775 US EXAM ABDO BACK WALL LIM: CPT

## 2018-01-01 PROCEDURE — 73030 X-RAY EXAM OF SHOULDER: CPT

## 2018-01-01 PROCEDURE — 80048 BASIC METABOLIC PNL TOTAL CA: CPT

## 2018-01-01 PROCEDURE — 70450 CT HEAD/BRAIN W/O DYE: CPT

## 2018-01-01 PROCEDURE — 83690 ASSAY OF LIPASE: CPT

## 2018-01-01 PROCEDURE — 71045 X-RAY EXAM CHEST 1 VIEW: CPT

## 2018-01-02 VITALS
DIASTOLIC BLOOD PRESSURE: 95 MMHG | RESPIRATION RATE: 15 BRPM | OXYGEN SATURATION: 99 % | HEART RATE: 64 BPM | SYSTOLIC BLOOD PRESSURE: 208 MMHG

## 2018-01-02 VITALS
DIASTOLIC BLOOD PRESSURE: 75 MMHG | OXYGEN SATURATION: 98 % | HEART RATE: 71 BPM | RESPIRATION RATE: 18 BRPM | TEMPERATURE: 98.8 F | SYSTOLIC BLOOD PRESSURE: 166 MMHG

## 2018-01-02 VITALS
RESPIRATION RATE: 18 BRPM | OXYGEN SATURATION: 99 % | SYSTOLIC BLOOD PRESSURE: 160 MMHG | TEMPERATURE: 97.9 F | HEART RATE: 76 BPM | DIASTOLIC BLOOD PRESSURE: 76 MMHG

## 2018-01-02 VITALS
OXYGEN SATURATION: 100 % | DIASTOLIC BLOOD PRESSURE: 88 MMHG | SYSTOLIC BLOOD PRESSURE: 195 MMHG | HEART RATE: 65 BPM | RESPIRATION RATE: 16 BRPM

## 2018-01-02 VITALS
RESPIRATION RATE: 16 BRPM | SYSTOLIC BLOOD PRESSURE: 195 MMHG | HEART RATE: 66 BPM | DIASTOLIC BLOOD PRESSURE: 88 MMHG | OXYGEN SATURATION: 99 %

## 2018-01-02 VITALS — HEART RATE: 71 BPM

## 2018-01-02 VITALS
TEMPERATURE: 97.7 F | RESPIRATION RATE: 18 BRPM | OXYGEN SATURATION: 99 % | DIASTOLIC BLOOD PRESSURE: 64 MMHG | HEART RATE: 69 BPM | SYSTOLIC BLOOD PRESSURE: 182 MMHG

## 2018-01-02 VITALS — RESPIRATION RATE: 18 BRPM | OXYGEN SATURATION: 99 %

## 2018-01-02 VITALS
DIASTOLIC BLOOD PRESSURE: 87 MMHG | RESPIRATION RATE: 16 BRPM | HEART RATE: 66 BPM | SYSTOLIC BLOOD PRESSURE: 181 MMHG | OXYGEN SATURATION: 100 %

## 2018-01-02 VITALS — HEART RATE: 72 BPM

## 2018-01-02 VITALS
SYSTOLIC BLOOD PRESSURE: 136 MMHG | DIASTOLIC BLOOD PRESSURE: 66 MMHG | TEMPERATURE: 97.6 F | OXYGEN SATURATION: 99 % | HEART RATE: 79 BPM | RESPIRATION RATE: 19 BRPM

## 2018-01-02 VITALS
SYSTOLIC BLOOD PRESSURE: 209 MMHG | OXYGEN SATURATION: 99 % | HEART RATE: 68 BPM | DIASTOLIC BLOOD PRESSURE: 95 MMHG | RESPIRATION RATE: 16 BRPM

## 2018-01-02 VITALS
HEART RATE: 70 BPM | SYSTOLIC BLOOD PRESSURE: 145 MMHG | DIASTOLIC BLOOD PRESSURE: 65 MMHG | OXYGEN SATURATION: 100 % | TEMPERATURE: 98.5 F | RESPIRATION RATE: 20 BRPM

## 2018-01-02 VITALS — HEART RATE: 70 BPM

## 2018-01-02 LAB
ALBUMIN SERPL-MCNC: 3 GM/DL (ref 3.4–5)
ALP SERPL-CCNC: 71 U/L (ref 45–117)
ALT SERPL-CCNC: 44 U/L (ref 12–78)
AST SERPL-CCNC: 26 U/L (ref 15–37)
BASOPHILS # BLD AUTO: 0 TH/MM3 (ref 0–0.2)
BASOPHILS NFR BLD: 0.5 % (ref 0–2)
BILIRUB SERPL-MCNC: 0.2 MG/DL (ref 0.2–1)
BUN SERPL-MCNC: 21 MG/DL (ref 7–18)
CALCIUM SERPL-MCNC: 8.7 MG/DL (ref 8.5–10.1)
CHLORIDE SERPL-SCNC: 106 MEQ/L (ref 98–107)
COLOR UR: (no result)
CREAT SERPL-MCNC: 1.17 MG/DL (ref 0.6–1.3)
CRP SERPL-MCNC: 0.86 MG/DL (ref 0–0.3)
EOSINOPHIL # BLD: 0.1 TH/MM3 (ref 0–0.4)
EOSINOPHIL NFR BLD: 1.1 % (ref 0–4)
ERYTHROCYTE [DISTWIDTH] IN BLOOD BY AUTOMATED COUNT: 14 % (ref 11.6–17.2)
GFR SERPLBLD BASED ON 1.73 SQ M-ARVRAT: 81 ML/MIN (ref 89–?)
GLUCOSE SERPL-MCNC: 216 MG/DL (ref 74–106)
GLUCOSE UR STRIP-MCNC: (no result) MG/DL
HCO3 BLD-SCNC: 30.5 MEQ/L (ref 21–32)
HCT VFR BLD CALC: 37.5 % (ref 39–51)
HGB BLD-MCNC: 12.4 GM/DL (ref 13–17)
HGB UR QL STRIP: (no result)
INR PPP: 1 RATIO
KETONES UR STRIP-MCNC: (no result) MG/DL
LIPASE: 223 U/L (ref 73–393)
LYMPHOCYTES # BLD AUTO: 2.5 TH/MM3 (ref 1–4.8)
LYMPHOCYTES NFR BLD AUTO: 33.2 % (ref 9–44)
MAGNESIUM SERPL-MCNC: 1.6 MG/DL (ref 1.5–2.5)
MCH RBC QN AUTO: 27.8 PG (ref 27–34)
MCHC RBC AUTO-ENTMCNC: 33 % (ref 32–36)
MCV RBC AUTO: 84.3 FL (ref 80–100)
MONOCYTE #: 0.7 TH/MM3 (ref 0–0.9)
MONOCYTES NFR BLD: 8.9 % (ref 0–8)
MUCOUS THREADS #/AREA URNS LPF: (no result) /LPF
NEUTROPHILS # BLD AUTO: 4.2 TH/MM3 (ref 1.8–7.7)
NEUTROPHILS NFR BLD AUTO: 56.3 % (ref 16–70)
NITRITE UR QL STRIP: (no result)
PLATELET # BLD: 285 TH/MM3 (ref 150–450)
PMV BLD AUTO: 9 FL (ref 7–11)
PROT SERPL-MCNC: 7.6 GM/DL (ref 6.4–8.2)
PROTHROMBIN TIME: 9.7 SEC (ref 9.8–11.6)
RBC # BLD AUTO: 4.44 MIL/MM3 (ref 4.5–5.9)
SODIUM SERPL-SCNC: 141 MEQ/L (ref 136–145)
SP GR UR STRIP: 1.01 (ref 1–1.03)
SQUAMOUS #/AREA URNS HPF: <1 /HPF (ref 0–5)
TROPONIN I SERPL-MCNC: 0.02 NG/ML (ref 0.02–0.05)
TROPONIN I SERPL-MCNC: 0.02 NG/ML (ref 0.02–0.05)
TROPONIN I SERPL-MCNC: 0.03 NG/ML (ref 0.02–0.05)
URINE LEUKOCYTE ESTERASE: (no result)
WBC # BLD AUTO: 7.4 TH/MM3 (ref 4–11)

## 2018-01-02 RX ADMIN — INSULIN ASPART SCH: 100 INJECTION, SOLUTION INTRAVENOUS; SUBCUTANEOUS at 11:57

## 2018-01-02 RX ADMIN — INSULIN ASPART SCH: 100 INJECTION, SOLUTION INTRAVENOUS; SUBCUTANEOUS at 16:10

## 2018-01-02 RX ADMIN — INSULIN ASPART SCH: 100 INJECTION, SOLUTION INTRAVENOUS; SUBCUTANEOUS at 20:44

## 2018-01-02 RX ADMIN — NIFEDIPINE SCH MG: 30 TABLET, FILM COATED, EXTENDED RELEASE ORAL at 07:44

## 2018-01-02 RX ADMIN — INSULIN ASPART SCH: 100 INJECTION, SOLUTION INTRAVENOUS; SUBCUTANEOUS at 07:44

## 2018-01-02 RX ADMIN — Medication SCH ML: at 07:44

## 2018-01-02 RX ADMIN — Medication SCH ML: at 20:44

## 2018-01-02 RX ADMIN — HYDROCHLOROTHIAZIDE SCH MG: 25 TABLET ORAL at 07:44

## 2018-01-02 RX ADMIN — LISINOPRIL SCH MG: 20 TABLET ORAL at 20:44

## 2018-01-02 RX ADMIN — LISINOPRIL SCH MG: 20 TABLET ORAL at 07:43

## 2018-01-02 NOTE — RADRPT
EXAM DATE/TIME:  01/02/2018 10:40 

 

[facility] COMPARISON:     

No previous studies available for comparison.

       

 

 

INDICATIONS :     

Aneurysm. Abnormal CT.

                     

 

MEDICAL HISTORY :     

Hypertension. Diabetes mellitus type 2.   

 

SURGICAL HISTORY :     

None.     

 

ENCOUNTER:     

Subsequent

 

ACUITY:     

2 day

 

PAIN SCORE:     

0/10

 

LOCATION:         

head.

 

Please note a normal MRA of the brain does not entirely exclude the possibility of a small aneurysm, 


nor the possibility of distal intracranial vessel disease.

 

TECHNIQUE:     

3D time of flight MRA was performed.  Source images, multiplanar STS MIP, and 3D volume MIP reconstru
ctions were reviewed.

 

FINDINGS:     

There is excellent visualization of the major intracranial arteries out to the second-order branch ve
ssels.  There is no evidence for aneurysm, vessel truncation or stenosis, and no evidence for vascula
r malformation.

 

CONCLUSION:     

Normal examination for a patient of this age.  

 

 

 

 Blane De Leon MD on January 02, 2018 at 12:39           

Board Certified Radiologist.

 This report was verified electronically.

## 2018-01-02 NOTE — RADRPT
EXAM DATE/TIME:  01/02/2018 10:40 

 

[facility] COMPARISON:     

No previous studies available for comparison.

       

 

 

INDICATIONS :     

Mass. Abnormal CT.

                     

 

CONTRAST:     

25 cc Omniscan (gadodiamide) IV

                     

 

MEDICAL HISTORY :     

Hypertension. Diabetes mellitus type 2.   

 

SURGICAL HISTORY :     

None.     

 

ENCOUNTER:     

Subsequent

 

ACUITY:     

2 day

 

PAIN SCORE:     

0/10

 

LOCATION:         

head.

 

TECHNIQUE:     

Multiplanar, multisequence MRI of the brain was performed both prior to and following the administrat
ion of paramagnetic contrast.

 

FINDINGS:     

Comparison is June 2017. Again seen is an 8 mm nonenhancing lesion in the genu the corpus callosum. T
here is surrounding susceptibility or blooming artifact characteristic of hemosiderin deposition. Sarah augustin differential diagnosis is a benign vascular lesion such as cavernous angioma. No significant tone
nge in size or appearance since June. Minimal white matter ischemic changes. No mass effect or shift.
 No hydrocephalus. No abnormal enhancing lesions in the brain.

 

CONCLUSION:     

1. Nonenhancing 8mm lesion in the genu of the corpus callosum associated hemosiderin deposition, prob
ably chronic small vascular malformation such as cavernous angioma. No change from June 2017. No mass
 effect or shift.

 

 

 

 Blane De Leon MD on January 02, 2018 at 12:34           

Board Certified Radiologist.

 This report was verified electronically.

## 2018-01-02 NOTE — RADRPT
EXAM DATE/TIME:  01/02/2018 01:19 

 

[facility] COMPARISON:     

CT BRAIN W/O CONTRAST, June 02, 2017, 9:32.  CT BRAIN W/O CONTRAST, June 03, 2017, 14:54.

 

 

INDICATIONS :     

Cephalgia.

                      

 

RADIATION DOSE:     

54.96 CTDIvol (mGy) ; Tabletop CT Head

 

 

 

MEDICAL HISTORY :     

Hypertension. Myocardial infarction. Diabetes. 

 

SURGICAL HISTORY :      

None. 

 

ENCOUNTER:      

Initial

 

ACUITY:      

1 day

 

PAIN SCALE:      

2/10

 

LOCATION:        

cranial 

 

TECHNIQUE:     

Multiple contiguous axial images were obtained of the head.  Using automated exposure control and adj
ustment of the mA and/or kV according to patient size, radiation dose was kept as low as reasonably a
chievable to obtain optimal diagnostic quality images.   DICOM format image data is available electro
nically for review and comparison.  

 

FINDINGS:     

 

CEREBRUM:     

Again seen is a hyperdense lesion involving the corpus callosum anteriorly. It measures 9 x 8 mm. It 
is not generally mass effect. The remaining brain parenchyma is unremarkable. The ventricles are norm
al for age.  No evidence of midline shift, hemorrhage or acute infarction.  No extra-axial fluid reese
ections are seen.

 

POSTERIOR FOSSA:     

The cerebellum and brainstem are intact.  The 4th ventricle is midline.  The cerebellopontine angle i
s unremarkable.

 

EXTRACRANIAL:     

The visualized portion of the orbits is intact.

 

SKULL:     

The calvaria is intact.  No evidence of skull fracture.

 

CONCLUSION:     

1. Unchanged hyperdense lesion involving anterior corpus callosum previously evaluated with MRI. 

2. No acute intracranial abnormality.

 

 

 

 Lei Soria Jr., MD on January 02, 2018 at 1:39           

Board Certified Radiologist.

 This report was verified electronically.

## 2018-01-02 NOTE — EKG
Date Performed: 01/02/2018       Time Performed: 11:26:00

 

PTAGE:      47 years

 

EKG:      Sinus rhythm 

 

 NONSPECIFIC T-WAVE ABNORMALITY BORDERLINE ECG

 

PREVIOUS TRACING       : 01/02/2018 00.16 Compared to prior tracing no significant change

 

DOCTOR:   Alexei Mata  Interpretating Date/Time  01/02/2018 17:24:41

## 2018-01-02 NOTE — PD.CONS
__________________________________________________





HPI


Service


neurosurgery


Consult Requested By


Dr Ibrahim


Reason for Consult


Intracranial mass


Primary Care Physician


No Primary Care Physician


History of Present Illness


This is a 47-year-old male with a history of uncontrolled hypertension, type 2 

diabetes mellitus and a brain lesion initially diagnosed in  of this year. 

He comes to the emergency department with severe right shoulder and left flank 

pain. He reports pain in his shoulder is "in his joint."  He also complains of 

left-sided flank pain. 





He has a known right frontal per midline mass previously evaluated in  of 

this year as a possible venous malformation or hemangioma versus CNS lymphoma 

or glioblastoma.  The patient elected to not have the mass biopsied at that 

time and was supposed to follow-up as outpatient 3 months after discharge.  He 

reports he was unable to do this secondary to losing his insurance. He denies 

any headaches at this time but does state he does have intermittent headaches.  

Patient was found to be hypertensive in the ED with a blood pressure of 250/

111.  He reports noncompliance with his blood pressure medications secondary to 

financial restraints and not having a PCP at this time.  Neurosurgery 

consultation was requested





Review of Systems


Constitutional:  DENIES: Diaphoretic episodes, Fatigue, Fever, Weight gain, 

Weight loss, Chills, Dizziness, Change in appetite, Night Sweats


Endocrine:  DENIES: Heat/cold intolerance, Polydipsia, Polyuria, Polyphagia


Eyes:  COMPLAINS OF: Blurred vision, Diplopia, Eye inflammation, Eye pain, 

Vision loss, Photosensitivity, Double Vision


Ears, nose, mouth, throat:  DENIES: Tinnitus, Hearing loss, Vertigo, Nasal 

discharge, Oral lesions, Throat pain, Hoarseness, Ear Pain, Running Nose, 

Epistaxis, Sinus Pain, Toothache, Odynophagia


Respiratory:  DENIES: Apneas, Cough, Snoring, Wheezing, Hemoptysis, Sputum 

production, Shortness of breath


Cardiovascular:  COMPLAINS OF: Chest pain, DENIES: Palpitations, Syncope, 

Dyspnea on Exertion, PND, Lower Extremity Edema, Orthopnea, Claudication


Gastrointestinal:  DENIES: Abdominal pain, Black stools, Bloody stools, 

Constipation, Diarrhea, Nausea, Vomiting, Difficulty Swallowing, Anorexia


Genitourinary:  DENIES: Sexual dysfunction, Urinary frequency, Urinary 

incontinence, Urgency, Hematuria, Dysuria, Nocturia, Penile Discharge, 

Testicular Pain, Testicular Swelling


Musculoskeletal:  COMPLAINS OF: Joint pain, Muscle aches, DENIES: Stiffness, 

Joint Swelling, Back pain, Neck pain


Integumentary:  DENIES: Abnormal pigmentation, Nail changes, Pruritus, Rash


Hematologic/lymphatic:  DENIES: Bruising, Lymphadenopathy


Immunologic/allergic:  DENIES: Eczema, Urticaria


Neurologic:  COMPLAINS OF: Headache, DENIES: Abnormal gait, Localized weakness, 

Paresthesias, Seizures, Speech Problems, Tremor, Poor Balance


Psychiatric:  DENIES: Anxiety, Confusion, Mood changes, Depression, 

Hallucinations, Agitation, Suicidal Ideation, Homicidal Ideation, Delusions





Past Family Social History


Allergies:  


Coded Allergies:  


     No Known Allergies (Verified  Allergy, Unknown, 18)


Past Medical History





Hypertensive


Diabetes mellitus


Brain lesion


Past Surgical History





No prior surgery


Active Ordered Medications





Current Medications


Labetalol HCl (Trandate Inj) 10 mg ONCE  ONCE IV PUSH  Last administered on at 00:56;  Start 18 at 00:45;  Stop 18 at 00:46;  Status DC


Morphine Sulfate (Morphine Inj) 4 mg ONCE  ONCE IV PUSH  Last administered on 18at 00:56;  Start 18 at 00:45;  Stop 18 at 00:46;  Status DC


Ondansetron HCl (Zofran Inj) 4 mg ONCE  ONCE IV PUSH  Last administered on at 00:57;  Start 18 at 00:45;  Stop 18 at 00:46;  Status DC


Iohexol (Omnipaque 350 Inj) 100 ml STK-MED ONCE IVCONTRAST  Last administered 

on 18at 01:26;  Start 18 at 01:26;  Stop 18 at 01:27;  Status DC


Nitroglycerin (Nitroglycerin 2% Oint) 1 inch ONCE  ONCE TOPICAL  Last 

administered on 18at 02:18;  Start 18 at 02:15;  Stop 18 at 02:16;  

Status DC


Aspirin (Aspirin Chew) 324 mg ONCE  ONCE CHEW  Last administered on 18at 02:

18;  Start 18 at 02:15;  Stop 18 at 02:16;  Status DC


Enalaprilat (Vasotec Inj) 1.25 mg ONCE  ONCE IV PUSH  Last administered on at 02:18;  Start 18 at 02:15;  Stop 18 at 02:16;  Status DC


Sodium Chloride (NS Flush) 2 ml UNSCH  PRN IV FLUSH FLUSH AFTER USING IV ACCESS

;  Start 18 at 03:00


Sodium Chloride (NS Flush) 2 ml BID IV FLUSH  Last administered on 18at 07:

44;  Start 18 at 09:00


Clonidine (Catapres) 0.1 mg Q6H  PRN PO SEE LABEL COMMENTS Last administered on 

18at 11:58;  Start 18 at 03:00


Lisinopril (Prinivil) 20 mg Q12HR PO  Last administered on 18at 07:43;  

Start 18 at 09:00


Hydrochlorothiazide (Hydrodiuril) 25 mg DAILY PO  Last administered on 18at 

07:44;  Start 18 at 09:00


Nifedipine (Procardia Xl) 30 mg DAILY PO  Last administered on 18at 07:44;  

Start 18 at 09:00


Lorazepam (Ativan Inj) 1 mg ONCE  ONCE IV PUSH  Last administered on 18at 03

:44;  Start 18 at 03:00;  Stop 18 at 03:15;  Status DC


Dextrose (D50w (Vial) Inj) 50 ml UNSCH  PRN IV PUSH HYPOGLYCEMIA-SEE COMMENTS;  

Start 18 at 03:00


Glucagon (Glucagon Inj) 1 mg UNSCH  PRN OTHER HYPOGLYCEMIA-SEE COMMENTS;  Start 

18 at 03:00


Insulin Aspart (NovoLOG SUPPLEMENTAL SCALE) 1 ACHS SLIDING  SCALE SQ  Last 

administered on 18at 11:57;  Start 18 at 08:00


Hydralazine HCl (Apresoline Inj) 10 mg ONCE  ONCE IV PUSH  Last administered on 

18at 03:44;  Start 18 at 03:30;  Stop 18 at 03:31;  Status DC


Lorazepam (Ativan Inj) 1 mg ONCE IV PUSH  Last administered on 18at 10:15;  

Start 18 at 09:30;  Stop 18 at 12:00;  Status DC


Gadodiamide (Omniscan Pf Inj) 25 ml STK-MED ONCE IVCONTRAST  Last administered 

on 18at 11:53;  Start 18 at 11:53;  Stop 18 at 11:54;  Status DC


Family History


Family history was reviewed


Mother  of MI at the age of 38


Social History





Denies alcohol, tobacco and illicit drugs





Physical Exam


Vital Signs





Vital Signs








  Date Time  Temp Pulse Resp B/P (MAP) Pulse Ox O2 Delivery O2 Flow Rate FiO2


 


18 12:27  70      


 


18 12:16 97.7 69 18 182/64 (103) 99   


 


18 09:12  71      


 


18 08:14 97.9 76 18 160/76 (104) 99   


 


18 05:00 98.5 70 20 145/65 (91) 100   


 


18 04:28        


 


18 03:45  66 16 181/87 (118) 100 Room Air  


 


18 03:00  65 16 195/88 (123) 100 Room Air  


 


18 02:30  64 15 208/95 (132) 99 Room Air  


 


18 02:02  66 16 195/88 (123) 99 Room Air  


 


18 01:34  68 16 209/95 (133) 99 Room Air  


 


18 00:09   18  99 Room Air  


 


18 23:38  70 19 250/111 (157) 99 Room Air  


 


18 23:06 98.4 73 16  98 Room Air  


 


18 23:06    219/105 (143)    








Physical Exam


The patient is alert, awake and oriented to time, place and person. Speech is 

fluent. Higher cognitive functions are normal.





Cranial nerve examination demonstrates the pupils to be equal, round, and 

reactive to light. Extra-ocular movements are intact. Facial motor and sensory 

function are normal and symmetrical. Gross hearing is intact, bilaterally. The 

uvula is midline and elevates symmetrically with the soft palate. 

Sternocleidomastoid and trapezius muscles have normal and symmetrical strength. 

Other cranial nerves are intact. 





Neck is soft and supple. Cervical spine has a full range of motion in anterior 

flexion, extension, lateral bending, and rotation without pain. There is no 

tenderness to palpation to the spinous processes or paraspinal muscles.  





Muscle testing reveals normal bulk and tone overall without rigidity, spasticity

, fasciculations, or atrophy. Muscle strength is 5/5 in all muscle groups of 

both upper extremities including deltoid, biceps, triceps, brachioradialis, 

wrist extension and . In the lower extremities, strength is 5/5 in both 

iliopsoas, quadriceps, hamstrings, plantar flexion, dorsiflexion, and extensor 

hallicus longus.  





Sensory examination is intact to light touch and sharp/dull discrimination in 

both the upper and lower extremities, symmetrically.  





Deep tendon reflexes are 2+ and symmetrical in the biceps, triceps, and 

brachioradialis, bilaterally, in the upper extremities. In the lower extremities

, the patellar and Achilles are 2+, bilaterally.  There is a bilateral plantar 

flexion response. Hoffmanns sign is negative. There is no clonus or other 

abnormal reflexes noted. 





Cerebellar examination is intact to finger-to-nose test, rapid rhythmic 

alternating motion. There is no dysmetria, dysdiadochokinesia, truncal ataxia, 

or tremor.


Laboratory





Laboratory Tests








Test


  18


23:50 18


07:03


 


White Blood Count 7.4  


 


Red Blood Count 4.44  


 


Hemoglobin 12.4  


 


Hematocrit 37.5  


 


Mean Corpuscular Volume 84.3  


 


Mean Corpuscular Hemoglobin 27.8  


 


Mean Corpuscular Hemoglobin


Concent 33.0 


  


 


 


Red Cell Distribution Width 14.0  


 


Platelet Count 285  


 


Mean Platelet Volume 9.0  


 


Neutrophils (%) (Auto) 56.3  


 


Lymphocytes (%) (Auto) 33.2  


 


Monocytes (%) (Auto) 8.9  


 


Eosinophils (%) (Auto) 1.1  


 


Basophils (%) (Auto) 0.5  


 


Neutrophils # (Auto) 4.2  


 


Lymphocytes # (Auto) 2.5  


 


Monocytes # (Auto) 0.7  


 


Eosinophils # (Auto) 0.1  


 


Basophils # (Auto) 0.0  


 


CBC Comment DIFF FINAL  


 


Differential Comment   


 


Prothrombin Time 9.7  


 


Prothromb Time International


Ratio 1.0 


  


 


 


Activated Partial


Thromboplast Time 24.8 


  


 


 


Urine Color LIGHT-YELLOW  


 


Urine Turbidity CLEAR  


 


Urine pH 6.5  


 


Urine Specific Gravity 1.014  


 


Urine Protein 300  


 


Urine Glucose (UA) TRACE  


 


Urine Ketones NEG  


 


Urine Occult Blood TRACE  


 


Urine Nitrite NEG  


 


Urine Bilirubin NEG  


 


Urine Urobilinogen LESS THAN 2.0  


 


Urine Leukocyte Esterase NEG  


 


Urine RBC LESS THAN 1  


 


Urine WBC 1  


 


Urine Squamous Epithelial


Cells <1 


  


 


 


Urine Mucus FEW  


 


Microscopic Urinalysis Comment


  CULT NOT


INDICATED 


 


 


Blood Urea Nitrogen 21  


 


Creatinine 1.17  


 


Random Glucose 216  


 


Total Protein 7.6  


 


Albumin 3.0  


 


Calcium Level 8.7  


 


Magnesium Level 1.6  


 


Alkaline Phosphatase 71  


 


Aspartate Amino Transf


(AST/SGOT) 26 


  


 


 


Alanine Aminotransferase


(ALT/SGPT) 44 


  


 


 


Total Bilirubin 0.2  


 


Sodium Level 141  


 


Potassium Level 4.1  


 


Chloride Level 106  


 


Carbon Dioxide Level 30.5  


 


Anion Gap 5  


 


Estimat Glomerular Filtration


Rate 81 


  


 


 


Total Creatine Kinase 451  296 


 


Creatine Kinase MB 4.3  


 


Creatine Kinase MB % 1.0  


 


Troponin I 0.03  0.02 


 


C-Reactive Protein 0.86  


 


B-Type Natriuretic Peptide 7  


 


Lipase 223  








Result Diagram:  


18





Imaging





Last 48 hours Impressions








Head CT 18 Signed





Impressions: 





 Service Date/Time:  2018 01:19 - CONCLUSION:  1. Unchanged 





 hyperdense lesion involving anterior corpus callosum previously evaluated with 





 MRI.  2. No acute intracranial abnormality.     Lei Soria Jr., MD 


 


Abdomen/Pelvis CT 18 Signed





Impressions: 





 Service Date/Time:  2018 01:22 - CONCLUSION:  1. No acute 





 abnormality. 2. Hepatic steatosis.     Lei Soria Jr., MD 


 


Chest X-Ray 18 Signed





Impressions: 





 Service Date/Time:  2018 00:05 - CONCLUSION:  1. Mild 





 cardiomegaly. Clear lungs.     Lei Soria Jr., MD 











Assessment and Plan


Assessment and Plan


 Caprini VTE Risk Assessment 


Caprini VTE Risk Assessment


Caprini VTE Risk Assessment:  No/Low Risk (score <= 1)


Caprini Risk Assessment Model











 Point Value = 1          Point Value = 2  Point Value = 3  Point Value = 5


 


Age 41-60


Minor surgery


BMI > 25 kg/m2


Swollen legs


Varicose veins


Pregnancy or postpartum


History of unexplained or recurrent


   spontaneous 


Oral contraceptives or hormone


   replacement


Sepsis (< 1 month)


Serious lung disease, including


   pneumonia (< 1 month)


Abnormal pulmonary function


Acute myocardial infarction


Congestive heart failure (< 1 month)


History of inflammatory bowel disease


Medical patient at bed rest Age 61-74


Arthroscopic surgery


Major open surgery (> 45 min)


Laparoscopic surgery (> 45 min)


Malignancy


Confined to bed (> 72 hours)


Immobilizing plaster cast


Central venous access Age >= 75


History of VTE


Family history of VTE


Factor V Leiden


Prothrombin 57242U


Lupus anticoagulant


Anticardiolipin antibodies


Elevated serum homocysteine


Heparin-induced thrombocytopenia


Other congenital or acquired


   thrombophilia Stroke (< 1 month)


Elective arthroplasty


Hip, pelvis, or leg fracture


Acute spinal cord injury (< 1 month)








Prophylaxis Regimen











   Total Risk


Factor Score Risk Level Prophylaxis Regimen


 


0-1      Low Early ambulation


 


2 Moderate Order ONE of the following:


*Sequential Compression Device (SCD)


*Heparin 5000 units SQ BID


 


3-4 Higher Order ONE of the following medications:


*Heparin 5000 units SQ TID


*Enoxaparin/Lovenox 40 mg SQ daily (WT < 150 kg, CrCl > 30 mL/min)


*Enoxaparin/Lovenox 30 mg SQ daily (WT < 150 kg, CrCl > 10-29 mL/min)


*Enoxaparin/Lovenox 30 mg SQ BID (WT < 150 kg, CrCl > 30 mL/min)


AND/OR


*Sequential Compression Device (SCD)


 


5 or more Highest Order ONE of the following medications:


*Heparin 5000 units SQ TID (Preferred with Epidurals)


*Enoxaparin/Lovenox 40 mg SQ daily (WT < 150 kg, CrCl > 30 mL/min)


*Enoxaparin/Lovenox 30 mg SQ daily (WT < 150 kg, CrCl > 10-29 mL/min)


*Enoxaparin/Lovenox 30 mg SQ BID (WT < 150 kg, CrCl > 30 mL/min)


AND


*Sequential Compression Device (SCD)











Attending Statement








Brain lesion


I discussed with him the differential diagnosis as well as the alternatives of 

treatment, including the possibility of tactic biopsy of the lesion area I 

recommend to obtain a follow up MRI to be compared with prior MRI of the brain, 

to determining whether the lesion is growing


Mr Norwood was noncompliant with follow-up





Hypertensive crisis. he was given Vasotec and labetalol


IV Hydralazine now  as needed


Continue home medications, lisinopril, Procardia, hydrochlorothiazide


Clonidine when necessary


Monitor BP closely





Chest pain. Initial troponin negative


EKG showed normal sinus rhythm without ST segment elevations or depressions


serial troponin/EKGs to rule out ACS





Type 2 diabetes mellitus Hold home metformin


Sliding scale insulin subcutaneous


Monitor blood glucose





Shoulder and flank pain


Likely musculoskeletal in origin


CT abdomen/pelvis negative





Pulmonary. aggressive pulmonary toilette, nasotracheal suction, and breathing 

treatments with nebulizers.





PT and OT eval





Nutrition.  Tolerating Oral diet





Renal. monitor closely urine output, BUN and creatinine





ID monitor for signs of infection





Protonix for stress ulcer prophylaxis





Delfin hose and SCD's for DVT prophylaxis





Further recommendations will be provided depending on the patient's clinical 

evaluation and follow up studies.











Hebert Maldonado MD 2018 12:43

## 2018-01-02 NOTE — PD
HPI


Chief Complaint:  Flank/Kidney Pain


Time Seen by Provider:  23:50


Travel History


International Travel<30 days:  No


Contact w/Intl Traveler<30days:  No


Traveled to known affect area:  No





History of Present Illness


HPI


The patient is a 47 year old male who presents to the [facility] emergency 

department with a history of right shoulder pain and left flank pain that began 

2 weeks ago. The pain has been constant.  The patient arrives with a bp of 250/

111. He is taking lisinopril for his bp however he threw it up this morning.  

He reports that he is taking 20 mg once a day.  He reports that in the past he 

was on multiple other blood pressure medications that he cannot recall the name 

of, however he currently has no primary care physician.  The pain in his 

shoulder and left flank is worse with movement and sharp in character. The pain 

is duller with staying still.  He denies any trauma or injury.  He denies any 

recent heavy lifting.  He reports that he began to have nausea today.  He 

reports that he's vomited twice.  He denies having any chest pain.  He has 

chronic shortness of breath and intermittent palpations.  On review of systems, 

he denies having any headache currently, neck pain, fevers or chills, cough, 

congestion,  abdominal pain, or neurologic symptoms. He has chronic diarrhea x5 

per day since starting on Metformin for DM years ago.  He reports that he has 

no dysuria or change in color of his urine, however he does have urinary 

frequency and urgency. PCP: None currently.





PFSH


Past Medical History


*** Narrative Medical


The patient's past medical history is significant for dm, hypertension, 

according to the electronic medical record a history of a brain mass evaluated 

and found and the hospital in June 2017.  The patient had an oncology 

evaluation by Dr. Leonardo, neurosurgical evaluation by Dr. Maldonado while in the 

hospital.  The type of mass was unable to be determined.  The mass was deep 

within the brain, therefore biopsy would be difficult.  According to the 

neurosurgical record it was recommended that he follow-up for repeat MRI 

imaging in 3 months.  He reports that he lost his insurance and never followed 

up with anyone regarding this.


ADHD:  No


Arthritis:  No


Asthma:  No


Autoimmune Disease:  No


Blood Disorders:  No


Anxiety:  No


Depression:  No


Heart Rhythm Problems:  No


Cancer:  No


Cardiovascular Problems:  Yes (HTN/ POSSIBLE MI)


High Cholesterol:  No


Chemotherapy:  No


Chest Pain:  No


Congestive Heart Failure:  No


COPD:  No


Cerebrovascular Accident:  No


Diabetes:  Yes


Patient Takes Glucophage:  Yes


Diminished Hearing:  No


Endocrine:  No


Gastrointestinal Disorders:  No


GERD:  No


Glaucoma:  No


Genitourinary:  No


Headaches:  No


Hepatitis:  No


Hiatal Hernia:  No


Heparin Induced Thrombocytopen:  No


Hypertension:  Yes


Immune Disorder:  No


Implanted Vascular Access Dvce:  No


Kidney Stones:  No


Musculoskeletal:  No


Neurologic:  No


Psychiatric:  No


Reproductive:  No


Respiratory:  Yes


Immunizations Current:  Yes


Migraines:  No


Myocardial Infarction:  No


Radiation Therapy:  No


Renal Failure:  No


Seizures:  No


Sickle Cell Disease:  No


Sleep Apnea:  Yes


Thyroid Disease:  No


Ulcer:  No





Past Surgical History


Surgical History:  No Previous Surgery


Abdominal Surgery:  No


AICD:  No


Appendectomy:  No


Arteriovenous Shunt:  No


Cardiac Surgery:  No


Cholecystectomy:  No


Ear Surgery:  No


Endocrine Surgery:  No


Eye Surgery:  No


Genitourinary Surgery:  No


Gynecologic Surgery:  No


Insulin Pump:  No


Joint Replacement:  No


Neurologic Surgery:  No


Oral Surgery:  No


Pacemaker:  No


Thoracic Surgery:  No


Other Surgery:  No





Social History


Alcohol Use:  No


Tobacco Use:  No


Substance Use:  No





Allergies-Medications


(Allergen,Severity, Reaction):  


Coded Allergies:  


     No Known Allergies (Verified  Allergy, Unknown, 1/2/18)


Reported Meds & Prescriptions





Reported Meds & Active Scripts


Active


[NIFEdipine SR] 30 MG Tabcr 30 Mg PO DAILY


[Lisinopril] 20 MG Tab 20 Mg PO BID


Reported


Hydrochlorothiazide 25 Mg Tab 25 Mg PO DAILY


Metformin (Metformin HCl) 500 Mg Tab 500 Mg PO BIDPC


     With meals








Review of Systems


Except as stated in HPI:  all other systems reviewed are Neg


General / Constitutional:  No: Fever


Eyes:  No: Visual changes


HENT:  Positive: Headaches (intermittent headaches, none currently), No: Neck 

Stiffness, Neck Pain


Cardiovascular:  Positive: Dyspnea on exertion (chronic), No: Chest Pain or 

Discomfort


Respiratory:  Positive: Shortness of Breath (chronic with exertion)


Gastrointestinal:  Positive: Nausea, Vomiting, No: Abdominal Pain


Genitourinary:  Positive: Urgency, Frequency, No: Dysuria


Musculoskeletal:  Positive: Myalgias, Arthralgias, Limited ROM, Pain


Skin:  No Rash


Neurologic:  No: Weakness, Change in Mentation, Slurred Speech


Psychiatric:  No: Depression


Endocrine:  No: Polydipsia


Hematologic/Lymphatic:  No: Easy Bruising





Physical Exam


Narrative


General: 


The patient is a well-developed well-nourished male in no acute distress. 





Head and Neck exam: 


Head is normocephalic atraumatic. 


Eyes: EOMI, pupils are equal round and reactive to light. 


Nose: Midline septum with pink mucous membranes 


Mouth: Dentition unremarkable. Moist mucus membranes. Posterior oropharynx is 

not erythematous. No tonsillar hypertrophy. Uvula midline. Airway patent. 


Neck: No palpable lymphadenopathy. No nuchal rigidity. No thyromegaly.  

Negative Brudzinski, negative Kernig sign.  Pain in his shoulder or arm with 

movement of his neck.





Cardiovascular: 


Regular rate and rhythm without murmurs, gallops, or rubs.  The patient 

incidentally also reports that while I am in the room he has a sharp chest 

pain.  He reports that it just began when he arrived in the emergency 

department.  The patient reports that the pain is in the left side of his 

chest.  It does not change with breathing in or out.  He has no chest wall 

tenderness on palpation.





Lungs: 


Clear to auscultation bilaterally. No wheezes, rhonchi, or rales.


 


Abdomen:


Soft, without tenderness to palpation in all 4 quadrants of the abdomen. No 

guarding, rebound, or rigidity.  Normal bowel sounds are audible.  No 

tenderness on palpation of McBurney's point.  The patient has abdominal 

distention related to central obesity, no masses are palpable.  





Extremities: 


No clubbing or cyanosis.  The patient has 1+ edema bilateral lower extremities.

  He reports that his edema is chronic and actually improved compared to 

previously.  2+ pulses in all 4 extremities.  No calf tenderness on palpation.  

On examination of the area of interest, the right shoulder, the patient has no 

deformity noted.  The patient has no clavicle tenderness on palpation or 

trapezius tenderness on palpation.  The patient has tenderness on palpation 

just below the before meals joint and the bicipital groove when the patient's 

right arm is adducted across his chest.  The patient also has pain with 

abduction past 40 of his shoulder.  He is unable to lift her shoulder  above 

his head due to pain





Back: 


No spinous process tenderness to palpation.  Left-sided CVA tenderness is noted 

on palpation. 





Neurologic Exam:


Cranial nerves 2-12 were intact on exam. Strength is 5/5 in all 4 extremities. 

No sensory deficits noted. No dysdiadochokinesis. Good finger to nose and Heel 

to shin bilaterally. 





Skin Exam: No rash noted. Intact skin that is warm and dry.





Data


Data


Last Documented VS





Vital Signs








  Date Time  Temp Pulse Resp B/P (MAP) Pulse Ox O2 Delivery O2 Flow Rate FiO2


 


1/2/18 02:02  66 16 195/88 (123) 99 Room Air  


 


1/1/18 23:06 98.4       








Orders





 Orders


Electrocardiogram (1/1/18 23:58)


Complete Blood Count With Diff (1/1/18 23:58)


Comprehensive Metabolic Panel (1/1/18 23:58)


Creatine Kinase (Cpk) (1/1/18 23:58)


Ckmb (Isoenzyme) Profile (1/1/18 23:58)


Troponin I (1/1/18 23:58)


B-Type Natriuretic Peptide (1/1/18 23:58)


Prothrombin Time / Inr (Pt) (1/1/18 23:58)


Act Partial Throm Time (Ptt) (1/1/18 23:58)


C-Reactive Protein (Crp) (1/1/18 23:58)


Lipase (1/1/18 23:58)


Urinalysis - C+S If Indicated (1/1/18 23:58)


Magnesium (Mg) (1/1/18 23:58)


Chest, Single Ap (1/1/18 23:58)


Iv Access Insert/Monitor (1/1/18 23:58)


Ecg Monitoring (1/1/18 23:58)


Oximetry (1/1/18 23:58)


CKMB (1/1/18 23:50)


CKMB% (1/1/18 23:50)


Labetalol Inj (Trandate Inj) (1/2/18 00:45)


Morphine Inj (Morphine Inj) (1/2/18 00:45)


Ondansetron Inj (Zofran Inj) (1/2/18 00:45)


Ct Abd/Pel W Iv Contrast(Rout) (1/2/18 00:41)


Ct Brain W/O Iv Contrast(Rout) (1/2/18 00:41)


Iohexol 350 Inj (Omnipaque 350 Inj) (1/2/18 01:26)


Nitroglycerin 2% Oint (Nitroglycerin 2% (1/2/18 02:15)


Aspirin Chew (Aspirin Chew) (1/2/18 02:15)


Enalaprilat Inj (Vasotec Inj) (1/2/18 02:15)


Admit Order (Ed Use Only) (1/2/18 02:17)





Labs





Laboratory Tests








Test


  1/1/18


23:50


 


White Blood Count 7.4 TH/MM3 


 


Red Blood Count 4.44 MIL/MM3 


 


Hemoglobin 12.4 GM/DL 


 


Hematocrit 37.5 % 


 


Mean Corpuscular Volume 84.3 FL 


 


Mean Corpuscular Hemoglobin 27.8 PG 


 


Mean Corpuscular Hemoglobin


Concent 33.0 % 


 


 


Red Cell Distribution Width 14.0 % 


 


Platelet Count 285 TH/MM3 


 


Mean Platelet Volume 9.0 FL 


 


Neutrophils (%) (Auto) 56.3 % 


 


Lymphocytes (%) (Auto) 33.2 % 


 


Monocytes (%) (Auto) 8.9 % 


 


Eosinophils (%) (Auto) 1.1 % 


 


Basophils (%) (Auto) 0.5 % 


 


Neutrophils # (Auto) 4.2 TH/MM3 


 


Lymphocytes # (Auto) 2.5 TH/MM3 


 


Monocytes # (Auto) 0.7 TH/MM3 


 


Eosinophils # (Auto) 0.1 TH/MM3 


 


Basophils # (Auto) 0.0 TH/MM3 


 


CBC Comment DIFF FINAL 


 


Differential Comment  


 


Prothrombin Time 9.7 SEC 


 


Prothromb Time International


Ratio 1.0 RATIO 


 


 


Activated Partial


Thromboplast Time 24.8 SEC 


 


 


Urine Color LIGHT-YELLOW 


 


Urine Turbidity CLEAR 


 


Urine pH 6.5 


 


Urine Specific Gravity 1.014 


 


Urine Protein 300 mg/dL 


 


Urine Glucose (UA) TRACE mg/dL 


 


Urine Ketones NEG mg/dL 


 


Urine Occult Blood TRACE 


 


Urine Nitrite NEG 


 


Urine Bilirubin NEG 


 


Urine Urobilinogen


  LESS THAN 2.0


MG/DL


 


Urine Leukocyte Esterase NEG 


 


Urine RBC


  LESS THAN 1


/hpf


 


Urine WBC 1 /hpf 


 


Urine Squamous Epithelial


Cells <1 /hpf 


 


 


Urine Mucus FEW /lpf 


 


Microscopic Urinalysis Comment


  CULT NOT


INDICATED


 


Blood Urea Nitrogen 21 MG/DL 


 


Creatinine 1.17 MG/DL 


 


Random Glucose 216 MG/DL 


 


Total Protein 7.6 GM/DL 


 


Albumin 3.0 GM/DL 


 


Calcium Level 8.7 MG/DL 


 


Magnesium Level 1.6 MG/DL 


 


Alkaline Phosphatase 71 U/L 


 


Aspartate Amino Transf


(AST/SGOT) 26 U/L 


 


 


Alanine Aminotransferase


(ALT/SGPT) 44 U/L 


 


 


Total Bilirubin 0.2 MG/DL 


 


Sodium Level 141 MEQ/L 


 


Potassium Level 4.1 MEQ/L 


 


Chloride Level 106 MEQ/L 


 


Carbon Dioxide Level 30.5 MEQ/L 


 


Anion Gap 5 MEQ/L 


 


Estimat Glomerular Filtration


Rate 81 ML/MIN 


 


 


Total Creatine Kinase 451 U/L 


 


Creatine Kinase MB 4.3 NG/ML 


 


Creatine Kinase MB % 1.0 % 


 


Troponin I 0.03 NG/ML 


 


C-Reactive Protein 0.86 MG/DL 


 


B-Type Natriuretic Peptide 7 PG/ML 


 


Lipase 223 U/L 











MDM


Medical Decision Making


Medical Screen Exam Complete:  Yes


Emergency Medical Condition:  Yes


Medical Record Reviewed:  Yes


Interpretation(s)





Last Impressions








Head CT 1/2/18 0041 Signed





Impressions: 





 Service Date/Time:  Tuesday, January 2, 2018 01:19 - CONCLUSION:  1. Unchanged 





 hyperdense lesion involving anterior corpus callosum previously evaluated with 





 MRI.  2. No acute intracranial abnormality.     Lei Soria Jr., MD 


 


Abdomen/Pelvis CT 1/2/18 0041 Signed





Impressions: 





 Service Date/Time:  Tuesday, January 2, 2018 01:22 - CONCLUSION:  1. No acute 





 abnormality. 2. Hepatic steatosis.     Lei Soria Jr., MD 


 


Chest X-Ray 1/1/18 5926 Signed





Impressions: 





 Service Date/Time:  Tuesday, January 2, 2018 00:05 - CONCLUSION:  1. Mild 





 cardiomegaly. Clear lungs.     Lei Soria Jr., MD 








Differential Diagnosis


Regarding left flank pain: Aortic dissection, versus kidney stone, versus 

urinary tract infection, versus musculoskeletal strain.





Regarding right shoulder pain: Differential diagnosis includes rotator cuff 

injury, versus arthritis involving the right shoulder, versus shoulder 

impingement, versus acute coronary syndrome


Narrative Course


During the course of the patients emergency department visit, the patients 

history, examination, and differential diagnosis were reviewed with the 

patient. The patient was placed on a cardiac monitor with oximetry and frequent 

blood pressure monitoring. The patient had [-] IV access obtained and blood 

work sent for analysis.  The patient had an ECG done on arrival.  The patient's 

ECG reveals a sinus rhythm heart rate of 66, nonspecific T-wave abnormalities, 

QRS duration is 90 ms,  ms.  No acute ST segment elevation is noted.  T 

waves are inverted in lead 3, V5, V6.  The patient during my examination 

reports having sharp chest pain.  He reports that he has never had this 

previously.  The patient arrives with a blood pressure of 250/111.





The patient was initially provided the patient was given labetalol 10 mg IV, 

morphine 4 mg IV, Zofran 4 mg IV.  Aspirin 324 mg by mouth 1, nitroglycerin 1 

inch the chest wall.  The patient continued to be hypertensive and was given 

Vasotec 1.25 mg IV.





The patients laboratory studies were reviewed and remarkable for a white count 

of 7.4, hemoglobin 12.4, platelets 285 with 8.9 monocytes, CMP is remarkable 

for a BUN of 21, glucose 216, , MB percent 1.0, troponin I 0.03, C-

reactive protein 0.86, BNP 7, lipase 223.  PT 9.7, PTT 24.8, urinalysis shows a 

protein of 300, trace occult blood, otherwise unremarkable.





Radiology studies were reviewed and remarkable for a chest x-ray that shows 

mild cardiomegaly, clear lungs.  CT scan of the brain shows unchanged 

hyperdense lesion involving the anterior corpus callosum, previously evaluated 

with MRI.  No acute intracranial abnormality.  CT scan of the abdomen and 

pelvis shows no acute abnormality.  Hepatic steatosis is noted.





The patient will be admitted to the hospital for hypertensive urgency, chest 

pain rule out serial cardiac enzyme protocol.





The patients results were discussed with the patient, including the plan of 

care. I explained that further testing and/ or monitoring is indicated based on 

the patients history, examination, and/ or laboratory findings. Therefore, I 

recommended admission for additional evaluation. The patient expressed 

understanding and was agreeable with this plan. The patient was admitted to the 

hospital in guarded condition and sent to a bed under the care of the [facility] service.





Physician Communication


Physician Communication


The patient's case including history, pertinent physical examination findings, 

and laboratory studies were discussed with Dr. Santacruz. It was agreed that the 

patient would be admitted to the [facility] service.





Diagnosis





 Primary Impression:  


 Hypertensive urgency


 Additional Impressions:  


 Noncompliance with medication regimen


 Chest pain


 Qualified Codes:  R07.2 - Precordial pain





Admitting Information


Admitting Physician Requests:  Admit











Gerri Parkinson MD Jan 2, 2018 00:04

## 2018-01-02 NOTE — RADRPT
EXAM DATE/TIME:  01/02/2018 01:22 

 

[facility] COMPARISON:     

CT ABDOMEN & PELVIS W CONTRAST, June 03, 2017, 14:59.

 

 

INDICATIONS :     

Left flank pain. 

                      

 

IV CONTRAST:     

100 cc Omnipaque 350 (iohexol) IV 

 

 

ORAL CONTRAST:      

No oral contrast ingested.

                      

 

RADIATION DOSE:     

27.06 CTDIvol (mGy) ; Patient body habitus

 

 

MEDICAL HISTORY :     

Hypertension. Myocardial infarction. Diabetes.

 

SURGICAL HISTORY :      

None. 

 

ENCOUNTER:      

Initial

 

ACUITY:      

1 day

 

PAIN SCALE:      

6/10

 

LOCATION:       

Left flank 

 

TECHNIQUE:     

Volumetric scanning of the abdomen and pelvis was performed.  Using automated exposure control and ad
justment of the mA and/or kV according to patient size, radiation dose was kept as low as reasonably 
achievable to obtain optimal diagnostic quality images.  DICOM format image data is available electro
nically for review and comparison.  

 

FINDINGS:     

 

LOWER LUNGS:     

Rdiomegaly. Clear lung bases.

 

LIVER:     

Homogeneous low in density without lesion.  There is no dilation of the biliary tree.  No calcified g
allstones.

 

SPLEEN:     

Normal size without lesion.

 

PANCREAS:     

Within normal limits.

 

KIDNEYS:     

Normal in size and shape.  There is no mass, stone or hydronephrosis. 1.5 cm cyst exophytic from the 
lower pole left kidney is stable.

 

ADRENAL GLANDS:     

Within normal limits.

 

VASCULAR:     

There is no aortic aneurysm.

 

BOWEL/MESENTERY:     

The stomach, small bowel, and colon demonstrate no acute abnormality.  There is no free intraperitone
al air or fluid.

 

ABDOMINAL WALL:     

Within normal limits.

 

RETROPERITONEUM:     

There is no lymphadenopathy. 

 

BLADDER:     

No wall thickening or mass. 

 

REPRODUCTIVE:     

Within normal limits.

 

INGUINAL:     

There is no lymphadenopathy or hernia. 

 

MUSCULOSKELETAL:     

Degenerative changes lumbar spine and SI joints. A large bridging osteophyte is seen involving the le
ft SI joint and

 

CONCLUSION:     

1. No acute abnormality.

2. Hepatic steatosis.

 

 

 

 Lei Soria Jr., MD on January 02, 2018 at 1:43           

Board Certified Radiologist.

 This report was verified electronically.

## 2018-01-02 NOTE — HHI.HP
__________________________________________________





[facility]


Service


[facility]


Primary Care Physician


No Primary Care Physician


Admission Diagnosis





Hypertensive urgency, chest pain


Diagnoses:  


Travel History


International Travel<30 Days:  No


Contact w/Intl Traveler <30 Da:  No


Traveled to Known Affected Are:  No


History of Present Illness


47-year-old male with a past medical history significant for uncontrolled 

hypertension, type 2 diabetes mellitus and a brain lesion initially diagnosed 

in  of this year presents to the emergency department complaining of right 

shoulder and left flank pain.  The patient reports he feels as if the pain in 

his shoulder is "in his joint."  He has full range of motion of his shoulder 

however experiences pain with movement.  He also complains of left-sided flank 

pain.  Creatinine 1.1, which is baseline for the patient.  CT of the abdomen 

and pelvis with no acute findings.  The patient has a known right frontal per 

midline mass previously evaluated in  of this year as a possible venous 

malformation or hemangioma versus CNS lymphoma or glioblastoma.  The patient 

elected to not have the mass biopsied at that time and was supposed to follow-

up with neurosurgery and a brain MRI 3 months after discharge.  He reports he 

was unable to do this secondary to losing his insurance.  He denies any 

headaches at this time but does state he does have intermittent migraines.  

Patient was found to be hypertensive in the ED with a blood pressure of 250/

111.  He reports noncompliance with his blood pressure medications secondary to 

financial restraints and not having a PCP at this time.  While being evaluated 

in the emergency department, the patient started to experience chest pain/

pressure.  He states the pain is still present and feels like a squeezing 

sensation.  Initial troponin negative.  EKG shows normal sinus rhythm without 

ST segment elevations or depressions.





Review of Systems


Denies fever or chills


Denies blurry vision, otorrhea, rhinorrhea 


Denies sore throat and cough


Positive chest pain and palpitations, No shortness of breath


No abdominal pain


Denies constipation/diarrhea/nausea/vomiting


Right shoulder and left flank pain


No rashes





Past Family Social History


Past Medical History


Hypertensive


Diabetes mellitus


Brain lesion


Past Surgical History


None


Allergies:  


Coded Allergies:  


     No Known Allergies (Verified  Adverse Reaction, Unknown, 18)


Family History


Mother  of MI at the age of 38


Social History


Denies alcohol, tobacco and illicit drugs





Physical Exam


Vital Signs





Vital Signs








  Date Time  Temp Pulse Resp B/P (MAP) Pulse Ox O2 Delivery O2 Flow Rate FiO2


 


18 03:00  65 16 195/88 (123) 100 Room Air  


 


18 02:30  64 15 208/95 (132) 99 Room Air  


 


18 02:02  66 16 195/88 (123) 99 Room Air  


 


18 01:34  68 16 209/95 (133) 99 Room Air  


 


18 00:09   18  99 Room Air  


 


18 23:38  70 19 250/111 (157) 99 Room Air  


 


18 23:06 98.4 73 16  98 Room Air  


 


18 23:06    219/105 (143)    








Physical Exam


GENERAL: Obese,  Krsity male lying in bed


SKIN: No rashes, ecchymoses or lesions. Cool and dry.


HEAD: Atraumatic. Normocephalic. No temporal or scalp tenderness.


EYES: Pupils equal round and reactive. Extraocular motions intact. No scleral 

icterus. No injection or drainage. 


ENT: Nose without bleeding, purulent drainage or septal hematoma. Throat 

without erythema, tonsillar hypertrophy or exudate. Uvula midline. Airway 

patent.


NECK: Trachea midline. No JVD or lymphadenopathy. Supple, nontender, no 

meningeal signs.


CARDIOVASCULAR: Regular rate and rhythm without murmurs, gallops, or rubs. 


RESPIRATORY: Clear to auscultation. Breath sounds equal bilaterally. No wheezes

, rales, or rhonchi.  


GASTROINTESTINAL: Abdomen soft, non-tender, nondistended. No hepato-splenomegaly

, or palpable masses. No guarding.


MUSCULOSKELETAL: Extremities without clubbing, cyanosis, or edema. No joint 

tenderness, effusion, or edema noted. No calf tenderness. 


NEUROLOGICAL: Awake and alert. Cranial nerves II through XII intact.  Motor and 

sensory grossly within normal limits.  Normal speech.


Laboratory





Laboratory Tests








Test


  18


23:50


 


White Blood Count 7.4 


 


Red Blood Count 4.44 


 


Hemoglobin 12.4 


 


Hematocrit 37.5 


 


Mean Corpuscular Volume 84.3 


 


Mean Corpuscular Hemoglobin 27.8 


 


Mean Corpuscular Hemoglobin


Concent 33.0 


 


 


Red Cell Distribution Width 14.0 


 


Platelet Count 285 


 


Mean Platelet Volume 9.0 


 


Neutrophils (%) (Auto) 56.3 


 


Lymphocytes (%) (Auto) 33.2 


 


Monocytes (%) (Auto) 8.9 


 


Eosinophils (%) (Auto) 1.1 


 


Basophils (%) (Auto) 0.5 


 


Neutrophils # (Auto) 4.2 


 


Lymphocytes # (Auto) 2.5 


 


Monocytes # (Auto) 0.7 


 


Eosinophils # (Auto) 0.1 


 


Basophils # (Auto) 0.0 


 


CBC Comment DIFF FINAL 


 


Differential Comment  


 


Prothrombin Time 9.7 


 


Prothromb Time International


Ratio 1.0 


 


 


Activated Partial


Thromboplast Time 24.8 


 


 


Urine Color LIGHT-YELLOW 


 


Urine Turbidity CLEAR 


 


Urine pH 6.5 


 


Urine Specific Gravity 1.014 


 


Urine Protein 300 


 


Urine Glucose (UA) TRACE 


 


Urine Ketones NEG 


 


Urine Occult Blood TRACE 


 


Urine Nitrite NEG 


 


Urine Bilirubin NEG 


 


Urine Urobilinogen LESS THAN 2.0 


 


Urine Leukocyte Esterase NEG 


 


Urine RBC LESS THAN 1 


 


Urine WBC 1 


 


Urine Squamous Epithelial


Cells <1 


 


 


Urine Mucus FEW 


 


Microscopic Urinalysis Comment


  CULT NOT


INDICATED


 


Blood Urea Nitrogen 21 


 


Creatinine 1.17 


 


Random Glucose 216 


 


Total Protein 7.6 


 


Albumin 3.0 


 


Calcium Level 8.7 


 


Magnesium Level 1.6 


 


Alkaline Phosphatase 71 


 


Aspartate Amino Transf


(AST/SGOT) 26 


 


 


Alanine Aminotransferase


(ALT/SGPT) 44 


 


 


Total Bilirubin 0.2 


 


Sodium Level 141 


 


Potassium Level 4.1 


 


Chloride Level 106 


 


Carbon Dioxide Level 30.5 


 


Anion Gap 5 


 


Estimat Glomerular Filtration


Rate 81 


 


 


Total Creatine Kinase 451 


 


Creatine Kinase MB 4.3 


 


Creatine Kinase MB % 1.0 


 


Troponin I 0.03 


 


C-Reactive Protein 0.86 


 


B-Type Natriuretic Peptide 7 


 


Lipase 223 








Result Diagram:  


18 235








Caprini VTE Risk Assessment


Caprini VTE Risk Assessment:  No/Low Risk (score <= 1)


Caprini Risk Assessment Model











 Point Value = 1          Point Value = 2  Point Value = 3  Point Value = 5


 


Age 41-60


Minor surgery


BMI > 25 kg/m2


Swollen legs


Varicose veins


Pregnancy or postpartum


History of unexplained or recurrent


   spontaneous 


Oral contraceptives or hormone


   replacement


Sepsis (< 1 month)


Serious lung disease, including


   pneumonia (< 1 month)


Abnormal pulmonary function


Acute myocardial infarction


Congestive heart failure (< 1 month)


History of inflammatory bowel disease


Medical patient at bed rest Age 61-74


Arthroscopic surgery


Major open surgery (> 45 min)


Laparoscopic surgery (> 45 min)


Malignancy


Confined to bed (> 72 hours)


Immobilizing plaster cast


Central venous access Age >= 75


History of VTE


Family history of VTE


Factor V Leiden


Prothrombin 23450Q


Lupus anticoagulant


Anticardiolipin antibodies


Elevated serum homocysteine


Heparin-induced thrombocytopenia


Other congenital or acquired


   thrombophilia Stroke (< 1 month)


Elective arthroplasty


Hip, pelvis, or leg fracture


Acute spinal cord injury (< 1 month)








Prophylaxis Regimen











   Total Risk


Factor Score Risk Level Prophylaxis Regimen


 


0-1      Low Early ambulation


 


2 Moderate Order ONE of the following:


*Sequential Compression Device (SCD)


*Heparin 5000 units SQ BID


 


3-4 Higher Order ONE of the following medications:


*Heparin 5000 units SQ TID


*Enoxaparin/Lovenox 40 mg SQ daily (WT < 150 kg, CrCl > 30 mL/min)


*Enoxaparin/Lovenox 30 mg SQ daily (WT < 150 kg, CrCl > 10-29 mL/min)


*Enoxaparin/Lovenox 30 mg SQ BID (WT < 150 kg, CrCl > 30 mL/min)


AND/OR


*Sequential Compression Device (SCD)


 


5 or more Highest Order ONE of the following medications:


*Heparin 5000 units SQ TID (Preferred with Epidurals)


*Enoxaparin/Lovenox 40 mg SQ daily (WT < 150 kg, CrCl > 30 mL/min)


*Enoxaparin/Lovenox 30 mg SQ daily (WT < 150 kg, CrCl > 10-29 mL/min)


*Enoxaparin/Lovenox 30 mg SQ BID (WT < 150 kg, CrCl > 30 mL/min)


AND


*Sequential Compression Device (SCD)











Assessment and Plan


Assessment and Plan


Assessment/plan:





1.  Hypertensive crisis


Patient is status post Vasotec and labetalol in the ED


IV Hydralazine now x 1 


Resume previous home medications of lisinopril, Procardia, hydrochlorothiazide


Clonidine when necessary


Monitor BP closely





2.  Brain lesion


Patient with previous evaluation of brain lesion with MRI that showed possible 

venous malformation or hemangioma and a CTA head that showed possible CNS 

lymphoma versus glioblastoma


Patient was noncompliant with follow-up


Previously evaluated by neurosurgery who recommended biopsy versus repeat MRI 

in 3 months


CT head showed an unchanged hyperdense lesion


MRI pending


Neurosurgery consulted, appreciate recommendations





3.  Chest pain


Initial troponin negative


EKG showed normal sinus rhythm without ST segment elevations or depressions, 

repeat by me


ACS rule out pending; serial troponin/EKGs





4.  Type 2 diabetes mellitus


Holding home metformin


Sliding scale insulin


Monitor blood glucose





5.  Shoulder and flank pain


Likely musculoskeletal in origin


CT abdomen/pelvis without evidence of hydronephrosis, UA negative, creatinine 

1.17 which is baseline for patient


Recommend outpatient f/u





FEN


Heart healthy diabetic diet


Electrolytes: monitor and replete prn


SCDs





Physician Certification


2 Midnight Certification Type:  Admission for Inpatient Services


Order for Inpatient Services


The services are ordered in accordance with Medicare regulations or non-

Medicare payer requirements, as applicable.  In the case of services not 

specified as inpatient-only, they are appropriately provided as inpatient 

services in accordance with the 2-midnight benchmark.


Estimated LOS (days):  2


2 days is the estimated time the patient will need to remain in the hospital, 

assuming treatment plan goals are met and no additional complications.


Post-Hospital Plan:  Not yet determined











Darlene Santacruz MD 2018 03:25

## 2018-01-02 NOTE — RADRPT
EXAM DATE/TIME:  01/02/2018 00:05 

 

[facility] COMPARISON:     

CHEST SINGLE AP, June 02, 2017, 14:24.

 

                     

INDICATIONS :     

Left lower chest pain. 

                     

 

MEDICAL HISTORY :     

Hypertension.          

 

SURGICAL HISTORY :     

None.   

 

ENCOUNTER:     

Initial                                        

 

ACUITY:     

2 weeks      

 

PAIN SCORE:     

10/10

 

LOCATION:     

Left chest 

 

FINDINGS:     

A single view of the chest demonstrates the lungs to be symmetrically aerated without evidence of mas
s, infiltrate or effusion. Stable mild cardiomegaly. No pulmonary vascular engorgement. Osseous struc
tures are intact.

 

CONCLUSION:     

1. Mild cardiomegaly. Clear lungs.

 

 

 

 Lei Soria Jr., MD on January 02, 2018 at 0:29           

Board Certified Radiologist.

 This report was verified electronically.

## 2018-01-02 NOTE — EKG
Date Performed: 01/02/2018       Time Performed: 00:16:53

 

PTAGE:      47 years

 

EKG:      Sinus rhythm 

 

 NONSPECIFIC T-WAVE ABNORMALITY BORDERLINE ECG

 

PREVIOUS TRACING        4/3/16 @ 07.02.18 Compared to prior tracing no significant change

 

DOCTOR:   Alexei Mata  Interpretating Date/Time  01/02/2018 17:24:07

## 2018-01-03 VITALS
HEART RATE: 75 BPM | DIASTOLIC BLOOD PRESSURE: 83 MMHG | RESPIRATION RATE: 18 BRPM | OXYGEN SATURATION: 95 % | TEMPERATURE: 98.8 F | SYSTOLIC BLOOD PRESSURE: 134 MMHG

## 2018-01-03 VITALS — HEART RATE: 65 BPM

## 2018-01-03 VITALS
HEART RATE: 71 BPM | DIASTOLIC BLOOD PRESSURE: 93 MMHG | SYSTOLIC BLOOD PRESSURE: 183 MMHG | TEMPERATURE: 97.8 F | RESPIRATION RATE: 20 BRPM | OXYGEN SATURATION: 100 %

## 2018-01-03 VITALS
HEART RATE: 71 BPM | DIASTOLIC BLOOD PRESSURE: 93 MMHG | SYSTOLIC BLOOD PRESSURE: 183 MMHG | OXYGEN SATURATION: 100 % | RESPIRATION RATE: 20 BRPM | TEMPERATURE: 97.8 F

## 2018-01-03 VITALS
RESPIRATION RATE: 20 BRPM | SYSTOLIC BLOOD PRESSURE: 182 MMHG | OXYGEN SATURATION: 95 % | TEMPERATURE: 97.8 F | HEART RATE: 71 BPM | DIASTOLIC BLOOD PRESSURE: 86 MMHG

## 2018-01-03 VITALS
HEART RATE: 83 BPM | OXYGEN SATURATION: 97 % | DIASTOLIC BLOOD PRESSURE: 77 MMHG | SYSTOLIC BLOOD PRESSURE: 142 MMHG | TEMPERATURE: 97.3 F | RESPIRATION RATE: 18 BRPM

## 2018-01-03 VITALS
DIASTOLIC BLOOD PRESSURE: 88 MMHG | SYSTOLIC BLOOD PRESSURE: 152 MMHG | RESPIRATION RATE: 20 BRPM | TEMPERATURE: 97.7 F | HEART RATE: 65 BPM | OXYGEN SATURATION: 98 %

## 2018-01-03 VITALS
HEART RATE: 64 BPM | SYSTOLIC BLOOD PRESSURE: 177 MMHG | DIASTOLIC BLOOD PRESSURE: 74 MMHG | TEMPERATURE: 97.3 F | OXYGEN SATURATION: 99 % | RESPIRATION RATE: 18 BRPM

## 2018-01-03 VITALS — HEART RATE: 74 BPM

## 2018-01-03 VITALS — HEART RATE: 67 BPM

## 2018-01-03 VITALS — HEART RATE: 86 BPM

## 2018-01-03 LAB
BASOPHILS # BLD AUTO: 0 TH/MM3 (ref 0–0.2)
BASOPHILS NFR BLD: 0.4 % (ref 0–2)
BUN SERPL-MCNC: 18 MG/DL (ref 7–18)
CALCIUM SERPL-MCNC: 8.8 MG/DL (ref 8.5–10.1)
CHLORIDE SERPL-SCNC: 100 MEQ/L (ref 98–107)
CREAT SERPL-MCNC: 1.13 MG/DL (ref 0.6–1.3)
EOSINOPHIL # BLD: 0.1 TH/MM3 (ref 0–0.4)
EOSINOPHIL NFR BLD: 2 % (ref 0–4)
ERYTHROCYTE [DISTWIDTH] IN BLOOD BY AUTOMATED COUNT: 14 % (ref 11.6–17.2)
GFR SERPLBLD BASED ON 1.73 SQ M-ARVRAT: 84 ML/MIN (ref 89–?)
GLUCOSE SERPL-MCNC: 165 MG/DL (ref 74–106)
HCO3 BLD-SCNC: 29.5 MEQ/L (ref 21–32)
HCT VFR BLD CALC: 36 % (ref 39–51)
HGB BLD-MCNC: 11.8 GM/DL (ref 13–17)
LYMPHOCYTES # BLD AUTO: 2.2 TH/MM3 (ref 1–4.8)
LYMPHOCYTES NFR BLD AUTO: 31.8 % (ref 9–44)
MCH RBC QN AUTO: 27.5 PG (ref 27–34)
MCHC RBC AUTO-ENTMCNC: 32.9 % (ref 32–36)
MCV RBC AUTO: 83.6 FL (ref 80–100)
MONOCYTE #: 0.7 TH/MM3 (ref 0–0.9)
MONOCYTES NFR BLD: 9.5 % (ref 0–8)
NEUTROPHILS # BLD AUTO: 3.9 TH/MM3 (ref 1.8–7.7)
NEUTROPHILS NFR BLD AUTO: 56.3 % (ref 16–70)
PLATELET # BLD: 260 TH/MM3 (ref 150–450)
PMV BLD AUTO: 8.8 FL (ref 7–11)
RBC # BLD AUTO: 4.3 MIL/MM3 (ref 4.5–5.9)
SODIUM SERPL-SCNC: 135 MEQ/L (ref 136–145)
WBC # BLD AUTO: 6.9 TH/MM3 (ref 4–11)

## 2018-01-03 RX ADMIN — INSULIN ASPART SCH: 100 INJECTION, SOLUTION INTRAVENOUS; SUBCUTANEOUS at 12:31

## 2018-01-03 RX ADMIN — LISINOPRIL SCH MG: 20 TABLET ORAL at 21:04

## 2018-01-03 RX ADMIN — STANDARDIZED SENNA CONCENTRATE AND DOCUSATE SODIUM SCH TAB: 8.6; 5 TABLET, FILM COATED ORAL at 21:00

## 2018-01-03 RX ADMIN — HYDROCHLOROTHIAZIDE SCH MG: 25 TABLET ORAL at 08:39

## 2018-01-03 RX ADMIN — Medication SCH ML: at 21:00

## 2018-01-03 RX ADMIN — Medication SCH ML: at 08:42

## 2018-01-03 RX ADMIN — LISINOPRIL SCH MG: 20 TABLET ORAL at 08:38

## 2018-01-03 RX ADMIN — PANTOPRAZOLE SCH MG: 40 TABLET, DELAYED RELEASE ORAL at 13:58

## 2018-01-03 RX ADMIN — INSULIN ASPART SCH: 100 INJECTION, SOLUTION INTRAVENOUS; SUBCUTANEOUS at 08:40

## 2018-01-03 RX ADMIN — INSULIN ASPART SCH: 100 INJECTION, SOLUTION INTRAVENOUS; SUBCUTANEOUS at 21:00

## 2018-01-03 RX ADMIN — INSULIN ASPART SCH: 100 INJECTION, SOLUTION INTRAVENOUS; SUBCUTANEOUS at 17:00

## 2018-01-03 RX ADMIN — STANDARDIZED SENNA CONCENTRATE AND DOCUSATE SODIUM SCH TAB: 8.6; 5 TABLET, FILM COATED ORAL at 12:24

## 2018-01-03 RX ADMIN — NIFEDIPINE SCH MG: 30 TABLET, FILM COATED, EXTENDED RELEASE ORAL at 08:39

## 2018-01-03 NOTE — RADRPT
EXAM DATE/TIME:  01/03/2018 13:04 

 

[facility] COMPARISON:     

No previous studies available for comparison.

 

                     

INDICATIONS :     

Right shoulder pain.

                     

 

MEDICAL HISTORY :            

Hypertension. Diabetes mellitus type 2.   

 

SURGICAL HISTORY :     

None.   

 

ENCOUNTER:     

Subsequent                                        

 

ACUITY:     

4 - 6 days      

 

PAIN SCORE:     

8/10

 

LOCATION:     

Right  Shoulder.

 

FINDINGS:     

 

There are degenerative changes at the AC joint with marked subacromial spurring.  Glenoid intact.  No
 fracture CONCLUSION:     

Significant subacromial spurring, no fracture

 

 

 

 Trev Stark MD FACR on January 03, 2018 at 13:26           

Board Certified Radiologist.

 This report was verified electronically.

## 2018-01-03 NOTE — HHI.PR
Subjective


Remarks


The patient says that his left flank has been painful for the past 2 weeks.  He 

says he started to experience that pain at work and it is worse with movement.  

He also mentioned his right shoulder pain started at that time and is also 

associated with movement.  He says he ran out of blood pressure medications 

about 30 days ago.  Discussed with nursing.





Objective


Vitals





Vital Signs








  Date Time  Temp Pulse Resp B/P (MAP) Pulse Ox O2 Delivery O2 Flow Rate FiO2


 


1/3/18 08:40  74      


 


1/3/18 08:04 97.8 71 20 182/86 (118) 95   


 


1/3/18 05:12  65      


 


1/3/18 04:00 98.8 75 18 134/83 (100) 95   


 


1/3/18 01:05  67      


 


1/3/18 00:00 97.3 83 18 142/77 (98) 97   


 


1/2/18 20:00 98.8 71 18 166/75 (105) 98   


 


1/2/18 17:00  72      


 


1/2/18 16:52 97.6 79 19 136/66 (89) 99   


 


1/2/18 12:27  70      


 


1/2/18 12:16 97.7 69 18 182/64 (103) 99   














I/O      


 


 1/2/18 1/2/18 1/2/18 1/3/18 1/3/18 1/3/18





 07:00 15:00 23:00 07:00 15:00 23:00


 


Intake Total   480 ml   


 


Balance   480 ml   


 


      


 


Intake Oral   480 ml   


 


# Voids   2 5  


 


# Bowel Movements   0   








Result Diagram:  


1/3/18 0610                                                                    

            1/3/18 0610





Imaging





Last Impressions








Head CT 1/2/18 0041 Signed





Impressions: 





 Service Date/Time:  Tuesday, January 2, 2018 01:19 - CONCLUSION:  1. Unchanged 





 hyperdense lesion involving anterior corpus callosum previously evaluated with 





 MRI.  2. No acute intracranial abnormality.     Lei Soria Jr., MD 


 


Abdomen/Pelvis CT 1/2/18 0041 Signed





Impressions: 





 Service Date/Time:  Tuesday, January 2, 2018 01:22 - CONCLUSION:  1. No acute 





 abnormality. 2. Hepatic steatosis.     Lei Soria Jr., MD 


 


Head Magnetic Resonance Angiography 1/2/18 0000 Signed





Impressions: 





 Service Date/Time:  Tuesday, January 2, 2018 10:40 - CONCLUSION:  Normal 





 examination for a patient of this age.       Blane De Leon MD 


 


Brain MRI 1/2/18 0000 Signed





Impressions: 





 Service Date/Time:  Tuesday, January 2, 2018 10:40 - CONCLUSION:  1. 





 Nonenhancing 8mm lesion in the genu of the corpus callosum associated 





 hemosiderin deposition, probably chronic small vascular malformation such as 





 cavernous angioma. No change from June 2017. No mass effect or shift.     

Blane De Leon MD 


 


Chest X-Ray 1/1/18 1742 Signed





Impressions: 





 Service Date/Time:  Tuesday, January 2, 2018 00:05 - CONCLUSION:  1. Mild 





 cardiomegaly. Clear lungs.     Lei Soria Jr., MD 








Objective Remarks


GENERAL: Obese male in NAD.


SKIN: No rashes, ecchymoses or lesions. Cool and dry.


HEAD: Atraumatic. Normocephalic. No temporal or scalp tenderness.


EYES: Pupils equal round and reactive. Extraocular motions intact. No scleral 

icterus. No injection or drainage. 


ENT: Nose without bleeding, purulent drainage or septal hematoma. Throat 

without erythema, tonsillar hypertrophy or exudate. Uvula midline. Airway 

patent.


NECK: Trachea midline. No JVD or lymphadenopathy. Supple, nontender, no 

meningeal signs.


CARDIOVASCULAR: Regular rate and rhythm without murmurs, gallops, or rubs. 


RESPIRATORY: Clear to auscultation. Breath sounds equal bilaterally. No wheezes

, rales, or rhonchi.  


GASTROINTESTINAL: Abdomen soft, non-tender, nondistended. No hepato-splenomegaly

, or palpable masses. No guarding. Left sided flank tenderness, no CVA 

tenderness.


MUSCULOSKELETAL: Right shoulder tender to palpation anteriorly. Restricted ROM s

/t pain. 1+ edema in the lower extremities. 


NEUROLOGICAL: Awake and alert. Cranial nerves II through XII intact.  Motor and 

sensory grossly within normal limits.  Normal speech.


PSYCH: Mood and affect appropriate.


Medications and IVs





Current Medications








 Medications


  (Trade)  Dose


 Ordered  Sig/Collin


 Route  Start Time


 Stop Time Status Last Admin


 


  (NS Flush)  2 ml  UNSCH  PRN


 IV FLUSH  1/2/18 03:00


     


 


 


  (NS Flush)  2 ml  BID


 IV FLUSH  1/2/18 09:00


    1/3/18 08:42


 


 


  (Prinivil)  20 mg  Q12HR


 PO  1/2/18 09:00


    1/3/18 08:38


 


 


  (Hydrodiuril)  25 mg  DAILY


 PO  1/2/18 09:00


    1/3/18 08:39


 


 


  (Procardia Xl)  30 mg  DAILY


 PO  1/2/18 09:00


    1/3/18 08:39


 


 


  (D50w (Vial) Inj)  50 ml  UNSCH  PRN


 IV PUSH  1/2/18 03:00


     


 


 


  (Glucagon Inj)  1 mg  UNSCH  PRN


 OTHER  1/2/18 03:00


     


 


 


  (NovoLOG


 SUPPLEMENTAL


 SCALE)  1  ACHS SLIDING  SCALE


 SQ  1/2/18 08:00


    1/3/18 08:40


 


 


  (Catapres)  0.2 mg  Q6H  PRN


 PO  1/2/18 17:00


     


 











A/P


Assessment and Plan


Hypertensive crisis


Patient is status post Vasotec and labetalol in the ED. He ran out of his home 

meds 30 days ago.


- Resume previous home medications of lisinopril, Procardia. Increase 

hydrochlorothiazide to 50 mg daily.


- Clonidine when necessary.





Brain lesion


Patient with previous evaluation of brain lesion with MRI that showed possible 

venous malformation or hemangioma and a CTA head that showed possible CNS 

lymphoma versus glioblastoma. Patient was noncompliant with follow-up. 

Previously evaluated by neurosurgery who recommended biopsy versus repeat MRI 

in 3 months. Repeat MRI stable.


- follow up with neurosurgery.





Chest pain


He has shoulder pain and flank pain. Not complaining of chest pain. Trops flat. 

EKG showed normal sinus rhythm without ST segment elevations or depressions.


- treat shoulder and flank pain





Type 2 diabetes mellitus


Well controlled.


- Holding home metformin.


- Sliding scale insulin.





Shoulder and flank pain


Likely musculoskeletal in origin as he started to develop them while working 

and pain is worse with movement. CT abdomen/pelvis negative.


- shoulder x ray.


- OT for right shoulder pain.


- pain control with a bowel regimen. 


- Flexeril.





PPx: Carson Johnosn DO Jake 3, 2018 11:28

## 2018-01-03 NOTE — HHI.NSPN
__________________________________________________





Note Status


Status:  Progress Note





Interval History


Interval History


This is a 47-year-old male with a history of uncontrolled hypertension, type 2 

diabetes mellitus and a brain lesion initially diagnosed in June of this year. 

He comes to the emergency department with severe right shoulder and left flank 

pain. He reports pain in his shoulder is "in his joint."  He also complains of 

left-sided flank pain. 





He has a known right frontal per midline mass previously evaluated in June of 

this year as a possible venous malformation or hemangioma versus CNS lymphoma 

or glioblastoma.  The patient elected to not have the mass biopsied at that 

time and was supposed to follow-up as outpatient 3 months after discharge.  He 

reports he was unable to do this secondary to losing his insurance. He denies 

any headaches at this time but does state he does have intermittent headaches.  

Patient was found to be hypertensive in the ED with a blood pressure of 250/

111.  He reports noncompliance with his blood pressure medications secondary to 

financial restraints and not having a PCP at this time.  Neurosurgery 

consultation was requested


 


1/3/18: reports to be doing ok, dw patient regarding f/u MRIs, he requests 

nonsurgical management.





Labs, Micro, & Vital Signs


Results











  Date Time  Temp Pulse Resp B/P (MAP) Pulse Ox O2 Delivery O2 Flow Rate FiO2


 


1/3/18 12:06 97.8 71 20 183/93 (123) 100   


 


1/3/18 11:32 97.8 71 20 183/93 (123) 100   


 


1/3/18 08:40  74      


 


1/3/18 08:04 97.8 71 20 182/86 (118) 95   


 


1/3/18 05:12  65      


 


1/3/18 04:00 98.8 75 18 134/83 (100) 95   


 


1/3/18 01:05  67      


 


1/3/18 00:00 97.3 83 18 142/77 (98) 97   


 


1/2/18 20:00 98.8 71 18 166/75 (105) 98   


 


1/2/18 17:00  72      


 


1/2/18 16:52 97.6 79 19 136/66 (89) 99   








Constitutional





Vital Signs








  Date Time  Temp Pulse Resp B/P (MAP) Pulse Ox O2 Delivery O2 Flow Rate FiO2


 


1/3/18 12:06 97.8 71 20 183/93 (123) 100   


 


1/3/18 11:32 97.8 71 20 183/93 (123) 100   


 


1/3/18 08:40  74      


 


1/3/18 08:04 97.8 71 20 182/86 (118) 95   


 


1/3/18 05:12  65      


 


1/3/18 04:00 98.8 75 18 134/83 (100) 95   


 


1/3/18 01:05  67      


 


1/3/18 00:00 97.3 83 18 142/77 (98) 97   


 


1/2/18 20:00 98.8 71 18 166/75 (105) 98   


 


1/2/18 17:00  72      


 


1/2/18 16:52 97.6 79 19 136/66 (89) 99   











Physical Exam


The patient is alert, awake and oriented to time, place and person. Speech is 

fluent. Higher cognitive functions are normal.





Cranial nerve examination demonstrates the pupils to be equal, round, and 

reactive to light. Extra-ocular movements are intact. Facial motor and sensory 

function are normal and symmetrical. Gross hearing is intact, bilaterally. The 

uvula is midline and elevates symmetrically with the soft palate. 

Sternocleidomastoid and trapezius muscles have normal and symmetrical strength. 

Other cranial nerves are intact. 





Neck is soft and supple. Cervical spine has a full range of motion in anterior 

flexion, extension, lateral bending, and rotation without pain. There is no 

tenderness to palpation to the spinous processes or paraspinal muscles.  





Muscle testing reveals normal bulk and tone overall without rigidity, spasticity

, fasciculations, or atrophy. Muscle strength is 5/5 in all muscle groups of 

both upper extremities including deltoid, biceps, triceps, brachioradialis, 

wrist extension and . In the lower extremities, strength is 5/5 in both 

iliopsoas, quadriceps, hamstrings, plantar flexion, dorsiflexion, and extensor 

hallicus longus.  





Sensory examination is intact to light touch and sharp/dull discrimination in 

both the upper and lower extremities, symmetrically.  





Deep tendon reflexes are 2+ and symmetrical in the biceps, triceps, and 

brachioradialis, bilaterally, in the upper extremities. In the lower extremities

, the patellar and Achilles are 2+, bilaterally.  There is a bilateral plantar 

flexion response. Hoffmanns sign is negative. There is no clonus or other 

abnormal reflexes noted. 





Cerebellar examination is intact to finger-to-nose test, rapid rhythmic 

alternating motion. There is no dysmetria, dysdiadochokinesia, truncal ataxia, 

or tremor.





Medications


Current Medications





Current Medications








 Medications


  (Trade)  Dose


 Ordered  Sig/Collin


 Route


 PRN Reason  Start Time


 Stop Time Status Last Admin


Dose Admin


 


 Sodium Chloride


  (NS Flush)  2 ml  UNSCH  PRN


 IV FLUSH


 FLUSH AFTER USING IV ACCESS  1/2/18 03:00


     


 


 


 Sodium Chloride


  (NS Flush)  2 ml  BID


 IV FLUSH


   1/2/18 09:00


    1/3/18 08:42


 


 


 Lisinopril


  (Prinivil)  20 mg  Q12HR


 PO


   1/2/18 09:00


    1/3/18 08:38


 


 


 Nifedipine


  (Procardia Xl)  30 mg  DAILY


 PO


   1/2/18 09:00


    1/3/18 08:39


 


 


 Dextrose


  (D50w (Vial) Inj)  50 ml  UNSCH  PRN


 IV PUSH


 HYPOGLYCEMIA-SEE COMMENTS  1/2/18 03:00


     


 


 


 Glucagon


  (Glucagon Inj)  1 mg  UNSCH  PRN


 OTHER


 HYPOGLYCEMIA-SEE COMMENTS  1/2/18 03:00


     


 


 


 Insulin Aspart


  (NovoLOG


 SUPPLEMENTAL


 SCALE)  1  ACHS SLIDING  SCALE


 SQ


   1/2/18 08:00


    1/3/18 12:31


 


 


 Clonidine


  (Catapres)  0.2 mg  Q6H  PRN


 PO


 SBP>180, DBP>110  1/2/18 17:00


     


 


 


 Hydrochlorothiazide


  (Hydrodiuril)  50 mg  DAILY


 PO


   1/4/18 09:00


     


 


 


 Oxycodone HCl


  (Roxicodone)  5 mg  Q4H  PRN


 PO


 pain 3-5  1/3/18 11:30


     


 


 


 Oxycodone HCl


  (Roxicodone)  10 mg  Q4H  PRN


 PO


 PAIN 6-10  1/3/18 12:00


    1/3/18 12:24


 


 


 Cyclobenzaprine


 HCl


  (Flexeril)  10 mg  Q8H  PRN


 PO


 muscle spasm  1/3/18 11:30


     


 


 


 Pantoprazole


 Sodium


  (Protonix)  40 mg  DAILY


 PO


   1/3/18 12:00


     


 


 


 Senna/Docusate


 Sodium


  (Kaitlyn-Colace)  1 tab  BID


 PO


   1/3/18 12:00


    1/3/18 12:24


 











Medical Decision Making


MDM Remarks


48 y/o male with brain mass


follow up MRI Brain with stable 8 mm nonenhancing lesion, prob hemosiderin 

deposition,  


MRA head and previous CTA Head negative for aneurysm or AVMs


Hypertensive crisis





Plan


Plan Remarks


MRI Brain reviewed to be nonenhancing lesion, no evidence of aneurysm or AVM on 

MRA


again discussed with Mr. Norwood the alternatives of treatment, including the 

possibility of stereotactic biopsy of the lesion vs nonsurgical management,


patient requests nonsurgical management at this time


cont medical management











Cira Pozo Jake 3, 2018 13:40

## 2018-01-04 VITALS
HEART RATE: 72 BPM | DIASTOLIC BLOOD PRESSURE: 74 MMHG | OXYGEN SATURATION: 97 % | RESPIRATION RATE: 20 BRPM | TEMPERATURE: 97.9 F | SYSTOLIC BLOOD PRESSURE: 132 MMHG

## 2018-01-04 VITALS
HEART RATE: 62 BPM | SYSTOLIC BLOOD PRESSURE: 153 MMHG | RESPIRATION RATE: 20 BRPM | OXYGEN SATURATION: 98 % | TEMPERATURE: 97.3 F | DIASTOLIC BLOOD PRESSURE: 70 MMHG

## 2018-01-04 VITALS
SYSTOLIC BLOOD PRESSURE: 181 MMHG | HEART RATE: 65 BPM | RESPIRATION RATE: 20 BRPM | OXYGEN SATURATION: 99 % | DIASTOLIC BLOOD PRESSURE: 80 MMHG | TEMPERATURE: 98.2 F

## 2018-01-04 VITALS
RESPIRATION RATE: 16 BRPM | TEMPERATURE: 97.4 F | SYSTOLIC BLOOD PRESSURE: 170 MMHG | HEART RATE: 72 BPM | DIASTOLIC BLOOD PRESSURE: 74 MMHG | OXYGEN SATURATION: 98 %

## 2018-01-04 VITALS — HEART RATE: 66 BPM

## 2018-01-04 VITALS — HEART RATE: 60 BPM

## 2018-01-04 VITALS
HEART RATE: 66 BPM | TEMPERATURE: 97.8 F | SYSTOLIC BLOOD PRESSURE: 187 MMHG | RESPIRATION RATE: 20 BRPM | DIASTOLIC BLOOD PRESSURE: 86 MMHG | OXYGEN SATURATION: 99 %

## 2018-01-04 VITALS — HEART RATE: 114 BPM

## 2018-01-04 VITALS
TEMPERATURE: 98.3 F | OXYGEN SATURATION: 97 % | RESPIRATION RATE: 20 BRPM | DIASTOLIC BLOOD PRESSURE: 88 MMHG | SYSTOLIC BLOOD PRESSURE: 147 MMHG | HEART RATE: 82 BPM

## 2018-01-04 VITALS — HEART RATE: 65 BPM

## 2018-01-04 RX ADMIN — DIAZEPAM SCH MG: 5 TABLET ORAL at 13:03

## 2018-01-04 RX ADMIN — INSULIN ASPART SCH: 100 INJECTION, SOLUTION INTRAVENOUS; SUBCUTANEOUS at 21:00

## 2018-01-04 RX ADMIN — NIFEDIPINE SCH MG: 30 TABLET, FILM COATED, EXTENDED RELEASE ORAL at 07:44

## 2018-01-04 RX ADMIN — INSULIN ASPART SCH: 100 INJECTION, SOLUTION INTRAVENOUS; SUBCUTANEOUS at 17:46

## 2018-01-04 RX ADMIN — CLONIDINE HYDROCHLORIDE SCH MG: 0.1 INJECTION, SOLUTION EPIDURAL at 14:21

## 2018-01-04 RX ADMIN — INSULIN ASPART SCH: 100 INJECTION, SOLUTION INTRAVENOUS; SUBCUTANEOUS at 13:03

## 2018-01-04 RX ADMIN — HYDROCHLOROTHIAZIDE SCH MG: 50 TABLET ORAL at 07:44

## 2018-01-04 RX ADMIN — DIAZEPAM SCH MG: 5 TABLET ORAL at 17:46

## 2018-01-04 RX ADMIN — PANTOPRAZOLE SCH MG: 40 TABLET, DELAYED RELEASE ORAL at 07:45

## 2018-01-04 RX ADMIN — LISINOPRIL SCH MG: 20 TABLET ORAL at 07:44

## 2018-01-04 RX ADMIN — STANDARDIZED SENNA CONCENTRATE AND DOCUSATE SODIUM SCH TAB: 8.6; 5 TABLET, FILM COATED ORAL at 21:00

## 2018-01-04 RX ADMIN — CLONIDINE HYDROCHLORIDE SCH MG: 0.1 INJECTION, SOLUTION EPIDURAL at 10:23

## 2018-01-04 RX ADMIN — STANDARDIZED SENNA CONCENTRATE AND DOCUSATE SODIUM SCH TAB: 8.6; 5 TABLET, FILM COATED ORAL at 07:45

## 2018-01-04 RX ADMIN — Medication SCH ML: at 21:00

## 2018-01-04 RX ADMIN — LISINOPRIL SCH MG: 20 TABLET ORAL at 22:01

## 2018-01-04 RX ADMIN — CLONIDINE HYDROCHLORIDE SCH MG: 0.1 INJECTION, SOLUTION EPIDURAL at 22:06

## 2018-01-04 RX ADMIN — DIAZEPAM SCH MG: 5 TABLET ORAL at 10:22

## 2018-01-04 RX ADMIN — Medication SCH ML: at 09:00

## 2018-01-04 RX ADMIN — INSULIN ASPART SCH: 100 INJECTION, SOLUTION INTRAVENOUS; SUBCUTANEOUS at 08:00

## 2018-01-04 NOTE — HHI.NSPN
__________________________________________________


 (Cira Pozo)





Note Status


Status:  Progress Note


 (Cira Pozo)





Interval History


Interval History


This is a 47-year-old male with a history of uncontrolled hypertension, type 2 

diabetes mellitus and a brain lesion initially diagnosed in June of this year. 

He comes to the emergency department with severe right shoulder and left flank 

pain. He reports pain in his shoulder is "in his joint."  He also complains of 

left-sided flank pain. 





He has a known right frontal per midline mass previously evaluated in June of 

this year as a possible venous malformation or hemangioma versus CNS lymphoma 

or glioblastoma.  The patient elected to not have the mass biopsied at that 

time and was supposed to follow-up as outpatient 3 months after discharge.  He 

reports he was unable to do this secondary to losing his insurance. He denies 

any headaches at this time but does state he does have intermittent headaches.  

Patient was found to be hypertensive in the ED with a blood pressure of 250/

111.  He reports noncompliance with his blood pressure medications secondary to 

financial restraints and not having a PCP at this time.  Neurosurgery 

consultation was requested


 


1/3/18: reports to be doing ok, dw patient regarding f/u MRIs, he requests 

nonsurgical management. 


1/4/18: no neurological changes, overall states to be feeling better


 (Cira Pozo)





Labs, Micro, & Vital Signs


Results











  Date Time  Temp Pulse Resp B/P (MAP) Pulse Ox O2 Delivery O2 Flow Rate FiO2


 


1/4/18 12:04 97.3 62 20 153/70 (97) 98   


 


1/4/18 07:35 97.8 66 20 187/86 (119) 99   


 


1/4/18 05:40  65      


 


1/4/18 04:00 97.4 72 16 170/74 (106) 98   


 


1/4/18 03:04  60      


 


1/4/18 01:14  66      


 


1/4/18 00:12 98.2 65 20 181/80 (113) 99   


 


1/3/18 20:00 97.3 64 18 177/74 (108) 99   


 


1/3/18 17:20   20     


 


1/3/18 16:31 97.7 65 20 152/88 (109) 98   








Constitutional





Vital Signs








  Date Time  Temp Pulse Resp B/P (MAP) Pulse Ox O2 Delivery O2 Flow Rate FiO2


 


1/4/18 12:04 97.3 62 20 153/70 (97) 98   


 


1/4/18 07:35 97.8 66 20 187/86 (119) 99   


 


1/4/18 05:40  65      


 


1/4/18 04:00 97.4 72 16 170/74 (106) 98   


 


1/4/18 03:04  60      


 


1/4/18 01:14  66      


 


1/4/18 00:12 98.2 65 20 181/80 (113) 99   


 


1/3/18 20:00 97.3 64 18 177/74 (108) 99   


 


1/3/18 17:20   20     


 


1/3/18 16:31 97.7 65 20 152/88 (109) 98   








 (Cira Pozo)





Review of Systems


Constitutional:  DENIES: Fever, Chills


Neurologic:  DENIES: Localized weakness (Cira Pozo)





Physical Exam


Mr. Norwood is alert, awake and oriented to time, place and person. Speech is 

fluent.  





Cranial nerve examination: pupils equal, round, and reactive to light. EOMs are 

intact. Facial motor are normal and symmetrical 





Neck is soft and supple.  





Muscle strength is 5/5 in all muscle groups of both upper and lower extremities





Sensory examination is intact to light touch in both the upper and lower 

extremities, symmetrically.  





Deep tendon reflexes are 2+ in the upper and lower extremities. 


 


 (Cira Pozo)


Mr. Norwood is alert, awake and oriented to time, place and person. Speech is 

fluent.  





Cranial nerve examination: pupils equal, round, and reactive to light. EOMs are 

intact. Facial motor are normal and symmetrical 





Neck is soft and supple.  





Muscle strength is 5/5 in all muscle groups of both upper and lower extremities





Sensory examination is intact to light touch in both the upper and lower 

extremities, symmetrically.  





Deep tendon reflexes are 2+ in the upper and lower extremities.


 (Hebert Maldonado MD)





Medications


Current Medications





Current Medications








 Medications


  (Trade)  Dose


 Ordered  Sig/Collin


 Route


 PRN Reason  Start Time


 Stop Time Status Last Admin


Dose Admin


 


 Sodium Chloride


  (NS Flush)  2 ml  UNSCH  PRN


 IV FLUSH


 FLUSH AFTER USING IV ACCESS  1/2/18 03:00


     


 


 


 Sodium Chloride


  (NS Flush)  2 ml  BID


 IV FLUSH


   1/2/18 09:00


    1/4/18 09:00


 


 


 Lisinopril


  (Prinivil)  20 mg  Q12HR


 PO


   1/2/18 09:00


    1/4/18 07:44


 


 


 Dextrose


  (D50w (Vial) Inj)  50 ml  UNSCH  PRN


 IV PUSH


 HYPOGLYCEMIA-SEE COMMENTS  1/2/18 03:00


     


 


 


 Glucagon


  (Glucagon Inj)  1 mg  UNSCH  PRN


 OTHER


 HYPOGLYCEMIA-SEE COMMENTS  1/2/18 03:00


     


 


 


 Insulin Aspart


  (NovoLOG


 SUPPLEMENTAL


 SCALE)  1  ACHS SLIDING  SCALE


 SQ


   1/2/18 08:00


    1/4/18 13:03


 


 


 Clonidine


  (Catapres)  0.2 mg  Q6H  PRN


 PO


 SBP>180, DBP>110  1/2/18 17:00


     


 


 


 Hydrochlorothiazide


  (Hydrodiuril)  50 mg  DAILY


 PO


   1/4/18 09:00


    1/4/18 07:44


 


 


 Oxycodone HCl


  (Roxicodone)  5 mg  Q4H  PRN


 PO


 pain 3-5  1/3/18 11:30


     


 


 


 Oxycodone HCl


  (Roxicodone)  10 mg  Q4H  PRN


 PO


 PAIN 6-10  1/3/18 12:00


    1/4/18 06:47


 


 


 Pantoprazole


 Sodium


  (Protonix)  40 mg  DAILY


 PO


   1/3/18 12:00


    1/4/18 07:45


 


 


 Senna/Docusate


 Sodium


  (Kaitlyn-Colace)  1 tab  BID


 PO


   1/3/18 12:00


    1/4/18 07:45


 


 


 Nifedipine


  (Procardia Xl)  60 mg  DAILY


 PO


   1/5/18 09:00


     


 


 


 Diazepam


  (Valium)  5 mg  TID


 PO


   1/4/18 09:45


    1/4/18 13:03


 


 


 Ketorolac


 Tromethamine


  (Toradol Inj)  30 mg  Q8HR


 IV PUSH


   1/4/18 09:45


 1/9/18 09:44  1/4/18 14:21


 








 (Cira Pozo)





Medical Decision Making


MDM Remarks


46 y/o male with brain mass


follow up MRI Brain with stable 8 mm nonenhancing lesion, prob hemosiderin 

deposition,  


MRA head and previous CTA Head negative for aneurysm or AVMs


Hypertensive crisis


 (Cira Pozo)





Plan


Plan Remarks


MRI Brain reviewed to be nonenhancing lesion, no evidence of aneurysm or AVM on 

MRA


again discussed with Mr. Norwood the alternatives of treatment, including the 

possibility of stereotactic biopsy of the lesion vs nonsurgical management,


patient continues to requests nonsurgical management at this time


cont medical management 


 


 (Cira Pozo)





Attending Statement


Continue neuro checks





MRI Brain reviewed to be nonenhancing lesion, no evidence of aneurysm or AVM on 

MRA


again discussed with Mr. Norwood the alternatives of treatment, including the 

possibility of stereotactic biopsy of the lesion vs nonsurgical management,


patient continues to requests nonsurgical management 





Pulmonary.. Continue aggressive pulmonary toilette, nasotracheal suction, and 

breathing treatments with nebulizers.





Nutrition. NPO





Renal. monitor closely urine output, BUN and creatinine





Endocrine. Monitor serial Acu checks and SSI as needed in detail





ID monitor for signs of infection





Protonix for stress ulcer prophylaxis





Delfin hose and SCD's for DVT prophylaxis. 





The exam, history, and the medical decision-making described in the above note 

were completed with the assistance of the mid-level provider. I reviewed and 

agree with the findings presented.  I attest that I had a face-to-face 

encounter with the patient on the same day, and personally performed and 

documented my assessment and findings in the medical record.


 (Hebert Maldonado MD)











Cira Pozo Jan 4, 2018 14:34


Hebert Maldonado MD Jan 6, 2018 14:13

## 2018-01-04 NOTE — HHI.PR
Subjective


Remarks


The patient requested antibiotics for his left flank pain.  He denies any blood 

in the urine or stool.  He says the pain in his right shoulder is improved.  He 

says the pain in his left flank gets worse with movement.  Discussed with 

nursing at the bedside.





Objective


Vitals





Vital Signs








  Date Time  Temp Pulse Resp B/P (MAP) Pulse Ox O2 Delivery O2 Flow Rate FiO2


 


1/4/18 07:35 97.8 66 20 187/86 (119) 99   


 


1/4/18 05:40  65      


 


1/4/18 04:00 97.4 72 16 170/74 (106) 98   


 


1/4/18 03:04  60      


 


1/4/18 01:14  66      


 


1/4/18 00:12 98.2 65 20 181/80 (113) 99   


 


1/3/18 20:00 97.3 64 18 177/74 (108) 99   


 


1/3/18 17:20   20     


 


1/3/18 16:31 97.7 65 20 152/88 (109) 98   


 


1/3/18 12:06 97.8 71 20 183/93 (123) 100   


 


1/3/18 12:00  86      


 


1/3/18 11:32 97.8 71 20 183/93 (123) 100   














I/O      


 


 1/3/18 1/3/18 1/3/18 1/4/18 1/4/18 1/4/18





 07:00 15:00 23:00 07:00 15:00 23:00


 


Intake Total   960 ml   


 


Balance   960 ml   


 


      


 


Intake Oral   960 ml   


 


# Voids 5  3 4  


 


# Bowel Movements    1  








Result Diagram:  


1/3/18 0610                                                                    

            1/3/18 0610





Imaging





Last Impressions








Shoulder X-Ray 1/3/18 0000 Signed





Impressions: 





 Service Date/Time:  Wednesday, January 3, 2018 13:04 - CONCLUSION:  

Significant 





 subacromial spurring, no fracture     Trev Stark MD  FACR


 


Head CT 1/2/18 0041 Signed





Impressions: 





 Service Date/Time:  Tuesday, January 2, 2018 01:19 - CONCLUSION:  1. Unchanged 





 hyperdense lesion involving anterior corpus callosum previously evaluated with 





 MRI.  2. No acute intracranial abnormality.     Lei Soria Jr., MD 


 


Abdomen/Pelvis CT 1/2/18 0041 Signed





Impressions: 





 Service Date/Time:  Tuesday, January 2, 2018 01:22 - CONCLUSION:  1. No acute 





 abnormality. 2. Hepatic steatosis.     Lei Soria Jr., MD 


 


Head Magnetic Resonance Angiography 1/2/18 0000 Signed





Impressions: 





 Service Date/Time:  Tuesday, January 2, 2018 10:40 - CONCLUSION:  Normal 





 examination for a patient of this age.       Blane De Leon MD 


 


Brain MRI 1/2/18 0000 Signed





Impressions: 





 Service Date/Time:  Tuesday, January 2, 2018 10:40 - CONCLUSION:  1. 





 Nonenhancing 8mm lesion in the genu of the corpus callosum associated 





 hemosiderin deposition, probably chronic small vascular malformation such as 





 cavernous angioma. No change from June 2017. No mass effect or shift.     

Blane De Leon MD 


 


Chest X-Ray 1/1/18 8296 Signed





Impressions: 





 Service Date/Time:  Tuesday, January 2, 2018 00:05 - CONCLUSION:  1. Mild 





 cardiomegaly. Clear lungs.     Lei Soria Jr., MD 








Objective Remarks


GENERAL: Obese male in NAD.


SKIN: No rashes, ecchymoses or lesions. Cool and dry.


HEAD: Atraumatic. Normocephalic. No temporal or scalp tenderness.


EYES: Pupils equal round and reactive. Extraocular motions intact. No scleral 

icterus. No injection or drainage. 


ENT: Nose without bleeding, purulent drainage or septal hematoma. Throat 

without erythema, tonsillar hypertrophy or exudate. Uvula midline. Airway 

patent.


NECK: Trachea midline. No JVD or lymphadenopathy. Supple, nontender, no 

meningeal signs.


CARDIOVASCULAR: Regular rate and rhythm without murmurs, gallops, or rubs. 


RESPIRATORY: Clear to auscultation. Breath sounds equal bilaterally. No wheezes

, rales, or rhonchi.  


GASTROINTESTINAL: Abdomen soft, non-tender, nondistended. No hepato-splenomegaly

, or palpable masses. No guarding. Left sided flank tenderness, no CVA 

tenderness.


MUSCULOSKELETAL: Right shoulder tender to palpation anteriorly. Restricted ROM s

/t pain. 1+ edema in the lower extremities. Tenderness to palpation of left 

lower back.


NEUROLOGICAL: Awake and alert. Cranial nerves II through XII intact.  Motor and 

sensory grossly within normal limits.  Normal speech.


PSYCH: Mood and affect appropriate.


Medications and IVs





Current Medications








 Medications


  (Trade)  Dose


 Ordered  Sig/Collin


 Route  Start Time


 Stop Time Status Last Admin


 


  (NS Flush)  2 ml  UNSCH  PRN


 IV FLUSH  1/2/18 03:00


     


 


 


  (NS Flush)  2 ml  BID


 IV FLUSH  1/2/18 09:00


    1/4/18 09:00


 


 


  (Prinivil)  20 mg  Q12HR


 PO  1/2/18 09:00


    1/4/18 07:44


 


 


  (D50w (Vial) Inj)  50 ml  UNSCH  PRN


 IV PUSH  1/2/18 03:00


     


 


 


  (Glucagon Inj)  1 mg  UNSCH  PRN


 OTHER  1/2/18 03:00


     


 


 


  (NovoLOG


 SUPPLEMENTAL


 SCALE)  1  ACHS SLIDING  SCALE


 SQ  1/2/18 08:00


    1/3/18 21:00


 


 


  (Catapres)  0.2 mg  Q6H  PRN


 PO  1/2/18 17:00


     


 


 


  (Hydrodiuril)  50 mg  DAILY


 PO  1/4/18 09:00


    1/4/18 07:44


 


 


  (Roxicodone)  5 mg  Q4H  PRN


 PO  1/3/18 11:30


     


 


 


  (Roxicodone)  10 mg  Q4H  PRN


 PO  1/3/18 12:00


    1/4/18 06:47


 


 


  (Flexeril)  10 mg  Q8H  PRN


 PO  1/3/18 11:30


     


 


 


  (Protonix)  40 mg  DAILY


 PO  1/3/18 12:00


    1/4/18 07:45


 


 


  (Kaitlyn-Colace)  1 tab  BID


 PO  1/3/18 12:00


    1/4/18 07:45


 


 


  (Procardia Xl)  60 mg  DAILY


 PO  1/5/18 09:00


     


 











A/P


Assessment and Plan


Hypertensive crisis


Patient is status post Vasotec and labetalol in the ED. He ran out of his home 

meds 30 days ago.


- Resume previous home medications of lisinopril, Procardia. Increase 

hydrochlorothiazide to 50 mg daily. Increase Procardia to 60 mg daily. 


- Clonidine when necessary.





Brain lesion


Patient with previous evaluation of brain lesion with MRI that showed possible 

venous malformation or hemangioma and a CTA head that showed possible CNS 

lymphoma versus glioblastoma. Patient was noncompliant with follow-up. 

Previously evaluated by neurosurgery who recommended biopsy versus repeat MRI 

in 3 months. Repeat MRI stable.


- follow up with neurosurgery. The pt prefers nonoperative management. 





Chest pain


He has shoulder pain and flank pain. Not complaining of chest pain. Trops flat. 

EKG showed normal sinus rhythm without ST segment elevations or depressions.


- treat shoulder and flank pain





Type 2 diabetes mellitus


Well controlled.


- Holding home metformin.


- Sliding scale insulin.





Shoulder and flank pain


Likely musculoskeletal in origin as he started to develop them while working 

and pain is worse with movement. CT abdomen/pelvis negative. Shoulder x ray 

with significant subacromial spurring. Shoulder pain is improved. The pt has 

lower left back pain on exam, negative SLR on that side. 


- OT for right shoulder pain.


- physical therapy.


- pain control with a bowel regimen. 


- standing Valium and Toradol for now.





PPx: SCDs











Carson Ibrahim DO Jan 4, 2018 09:44

## 2018-01-05 VITALS
SYSTOLIC BLOOD PRESSURE: 150 MMHG | DIASTOLIC BLOOD PRESSURE: 84 MMHG | OXYGEN SATURATION: 97 % | RESPIRATION RATE: 18 BRPM | HEART RATE: 67 BPM | TEMPERATURE: 97.4 F

## 2018-01-05 VITALS — HEART RATE: 73 BPM

## 2018-01-05 VITALS
TEMPERATURE: 97.6 F | DIASTOLIC BLOOD PRESSURE: 80 MMHG | OXYGEN SATURATION: 98 % | RESPIRATION RATE: 19 BRPM | HEART RATE: 73 BPM | SYSTOLIC BLOOD PRESSURE: 161 MMHG

## 2018-01-05 VITALS
SYSTOLIC BLOOD PRESSURE: 133 MMHG | OXYGEN SATURATION: 95 % | RESPIRATION RATE: 18 BRPM | TEMPERATURE: 97.6 F | DIASTOLIC BLOOD PRESSURE: 71 MMHG | HEART RATE: 71 BPM

## 2018-01-05 VITALS
DIASTOLIC BLOOD PRESSURE: 76 MMHG | RESPIRATION RATE: 18 BRPM | TEMPERATURE: 98.1 F | HEART RATE: 74 BPM | SYSTOLIC BLOOD PRESSURE: 142 MMHG | OXYGEN SATURATION: 98 %

## 2018-01-05 VITALS
RESPIRATION RATE: 18 BRPM | TEMPERATURE: 97.8 F | HEART RATE: 79 BPM | DIASTOLIC BLOOD PRESSURE: 82 MMHG | OXYGEN SATURATION: 97 % | SYSTOLIC BLOOD PRESSURE: 177 MMHG

## 2018-01-05 VITALS
TEMPERATURE: 97.6 F | OXYGEN SATURATION: 98 % | RESPIRATION RATE: 18 BRPM | HEART RATE: 66 BPM | SYSTOLIC BLOOD PRESSURE: 140 MMHG | DIASTOLIC BLOOD PRESSURE: 63 MMHG

## 2018-01-05 VITALS — HEART RATE: 95 BPM

## 2018-01-05 VITALS — HEART RATE: 90 BPM

## 2018-01-05 LAB
ALBUMIN SERPL-MCNC: 3.4 GM/DL (ref 3.4–5)
ALP SERPL-CCNC: 82 U/L (ref 45–117)
ALT SERPL-CCNC: 49 U/L (ref 12–78)
AST SERPL-CCNC: 30 U/L (ref 15–37)
BILIRUB INDIRECT SERPL-MCNC: 0.4 MG/DL (ref 0–0.8)
BILIRUB SERPL-MCNC: 0.5 MG/DL (ref 0.2–1)
BUN SERPL-MCNC: 38 MG/DL (ref 7–18)
CALCIUM SERPL-MCNC: 9 MG/DL (ref 8.5–10.1)
CHLORIDE SERPL-SCNC: 94 MEQ/L (ref 98–107)
CREAT SERPL-MCNC: 1.65 MG/DL (ref 0.6–1.3)
DIRECT BILIRUBIN ADULT: 0.1 MG/DL (ref 0–0.2)
ERYTHROCYTE [DISTWIDTH] IN BLOOD BY AUTOMATED COUNT: 13.8 % (ref 11.6–17.2)
GFR SERPLBLD BASED ON 1.73 SQ M-ARVRAT: 54 ML/MIN (ref 89–?)
GLUCOSE SERPL-MCNC: 161 MG/DL (ref 74–106)
HCO3 BLD-SCNC: 28.2 MEQ/L (ref 21–32)
HCT VFR BLD CALC: 41.7 % (ref 39–51)
HGB BLD-MCNC: 13.9 GM/DL (ref 13–17)
MAGNESIUM SERPL-MCNC: 2.1 MG/DL (ref 1.5–2.5)
MCH RBC QN AUTO: 28 PG (ref 27–34)
MCHC RBC AUTO-ENTMCNC: 33.5 % (ref 32–36)
MCV RBC AUTO: 83.6 FL (ref 80–100)
PLATELET # BLD: 362 TH/MM3 (ref 150–450)
PMV BLD AUTO: 9.4 FL (ref 7–11)
PROT SERPL-MCNC: 8 GM/DL (ref 6.4–8.2)
RBC # BLD AUTO: 4.98 MIL/MM3 (ref 4.5–5.9)
SODIUM SERPL-SCNC: 131 MEQ/L (ref 136–145)
WBC # BLD AUTO: 6.1 TH/MM3 (ref 4–11)

## 2018-01-05 RX ADMIN — CLONIDINE HYDROCHLORIDE SCH MG: 0.1 INJECTION, SOLUTION EPIDURAL at 05:57

## 2018-01-05 RX ADMIN — DIAZEPAM SCH MG: 5 TABLET ORAL at 17:31

## 2018-01-05 RX ADMIN — PHENYTOIN SODIUM SCH MLS/HR: 50 INJECTION INTRAMUSCULAR; INTRAVENOUS at 12:46

## 2018-01-05 RX ADMIN — LISINOPRIL SCH MG: 20 TABLET ORAL at 08:34

## 2018-01-05 RX ADMIN — DIAZEPAM SCH MG: 5 TABLET ORAL at 08:34

## 2018-01-05 RX ADMIN — INSULIN ASPART SCH: 100 INJECTION, SOLUTION INTRAVENOUS; SUBCUTANEOUS at 17:31

## 2018-01-05 RX ADMIN — INSULIN ASPART SCH: 100 INJECTION, SOLUTION INTRAVENOUS; SUBCUTANEOUS at 21:30

## 2018-01-05 RX ADMIN — PANTOPRAZOLE SCH MG: 40 TABLET, DELAYED RELEASE ORAL at 08:34

## 2018-01-05 RX ADMIN — DIAZEPAM SCH MG: 5 TABLET ORAL at 12:47

## 2018-01-05 RX ADMIN — STANDARDIZED SENNA CONCENTRATE AND DOCUSATE SODIUM SCH TAB: 8.6; 5 TABLET, FILM COATED ORAL at 21:25

## 2018-01-05 RX ADMIN — Medication SCH ML: at 21:26

## 2018-01-05 RX ADMIN — NIFEDIPINE SCH MG: 60 TABLET, FILM COATED, EXTENDED RELEASE ORAL at 08:34

## 2018-01-05 RX ADMIN — INSULIN ASPART SCH: 100 INJECTION, SOLUTION INTRAVENOUS; SUBCUTANEOUS at 08:33

## 2018-01-05 RX ADMIN — Medication SCH ML: at 08:34

## 2018-01-05 RX ADMIN — HYDROCHLOROTHIAZIDE SCH MG: 50 TABLET ORAL at 08:34

## 2018-01-05 RX ADMIN — STANDARDIZED SENNA CONCENTRATE AND DOCUSATE SODIUM SCH TAB: 8.6; 5 TABLET, FILM COATED ORAL at 08:34

## 2018-01-05 RX ADMIN — INSULIN ASPART SCH: 100 INJECTION, SOLUTION INTRAVENOUS; SUBCUTANEOUS at 12:55

## 2018-01-05 RX ADMIN — ACYCLOVIR SCH UNITS: 800 TABLET ORAL at 12:08

## 2018-01-05 NOTE — HHI.NSPN
__________________________________________________


 (Cira Pozo)





Note Status


Status:  Progress Note


 (Cira Pozo)


Status:  Progress Note


 (Hebert Maldonado MD)





Interval History


Interval History


This is a 47-year-old male with a history of uncontrolled hypertension, type 2 

diabetes mellitus and a brain lesion initially diagnosed in June of this year. 

He comes to the emergency department with severe right shoulder and left flank 

pain. He reports pain in his shoulder is "in his joint."  He also complains of 

left-sided flank pain. 





He has a known right frontal per midline mass previously evaluated in June of 

this year as a possible venous malformation or hemangioma versus CNS lymphoma 

or glioblastoma.  The patient elected to not have the mass biopsied at that 

time and was supposed to follow-up as outpatient 3 months after discharge.  He 

reports he was unable to do this secondary to losing his insurance. He denies 

any headaches at this time but does state he does have intermittent headaches.  

Patient was found to be hypertensive in the ED with a blood pressure of 250/

111.  He reports noncompliance with his blood pressure medications secondary to 

financial restraints and not having a PCP at this time.  Neurosurgery 

consultation was requested


 


1/3/18: reports to be doing ok, dw patient regarding f/u MRIs, he requests 

nonsurgical management. 


1/4/18: no neurological changes, overall states to be feeling better


1/5/18: stable neurological exam, eager for discharge home


 (Cira Pozo)





Labs, Micro, & Vital Signs


Results











  Date Time  Temp Pulse Resp B/P (MAP) Pulse Ox O2 Delivery O2 Flow Rate FiO2


 


1/5/18 08:14 97.6 71 18 133/71 (91) 95   


 


1/5/18 04:00 97.6 66 18 140/63 (88) 98   


 


1/5/18 00:00 97.6 73 19 161/80 (107) 98   


 


1/4/18 20:44  114      


 


1/4/18 20:00 98.3 82 20 147/88 (107) 97   


 


1/4/18 16:33 97.9 72 20 132/74 (93) 97   


 


1/4/18 12:04 97.3 62 20 153/70 (97) 98   


 


1/4/18 11:23   19     








Constitutional





Vital Signs








  Date Time  Temp Pulse Resp B/P (MAP) Pulse Ox O2 Delivery O2 Flow Rate FiO2


 


1/5/18 08:14 97.6 71 18 133/71 (91) 95   


 


1/5/18 04:00 97.6 66 18 140/63 (88) 98   


 


1/5/18 00:00 97.6 73 19 161/80 (107) 98   


 


1/4/18 20:44  114      


 


1/4/18 20:00 98.3 82 20 147/88 (107) 97   


 


1/4/18 16:33 97.9 72 20 132/74 (93) 97   


 


1/4/18 12:04 97.3 62 20 153/70 (97) 98   


 


1/4/18 11:23   19     








 (Cira Pozo)





Physical Exam


Mr. Norwood is alert, awake and oriented to time, place and person. Speech is 

fluent.  





Cranial nerve examination: pupils equal, round, and reactive to light. EOMs are 

intact. Facial motor are normal and symmetrical 





Neck is soft and supple.  No meningismus or nuchal rigidity





Muscle strength is 5/5 in all muscle groups of both upper and lower extremities





Sensory examination is intact to light touch in both the upper and lower 

extremities, symmetrically.  





Deep tendon reflexes are 2+ in the upper and lower extremities. 


 


 (Cira Pozo)


Mr. Norwood is alert, awake and oriented to time, place and person. Speech is 

fluent.  





Cranial nerve examination: pupils equal, round, and reactive to light. EOMs are 

intact. Facial motor are normal and symmetrical 





Neck is soft and supple.  No meningismus or nuchal rigidity





Muscle strength is 5/5 in all muscle groups of both upper and lower extremities





Sensory examination is intact to light touch in both the upper and lower 

extremities, symmetrically.  





Deep tendon reflexes are 2+ in the upper and lower extremities.


 (Hebert Maldonado MD)





Medications


Current Medications





Current Medications








 Medications


  (Trade)  Dose


 Ordered  Sig/Collin


 Route


 PRN Reason  Start Time


 Stop Time Status Last Admin


Dose Admin


 


 Sodium Chloride


  (NS Flush)  2 ml  UNSCH  PRN


 IV FLUSH


 FLUSH AFTER USING IV ACCESS  1/2/18 03:00


     


 


 


 Sodium Chloride


  (NS Flush)  2 ml  BID


 IV FLUSH


   1/2/18 09:00


    1/5/18 08:34


 


 


 Lisinopril


  (Prinivil)  20 mg  Q12HR


 PO


   1/2/18 09:00


    1/5/18 08:34


 


 


 Dextrose


  (D50w (Vial) Inj)  50 ml  UNSCH  PRN


 IV PUSH


 HYPOGLYCEMIA-SEE COMMENTS  1/2/18 03:00


     


 


 


 Glucagon


  (Glucagon Inj)  1 mg  UNSCH  PRN


 OTHER


 HYPOGLYCEMIA-SEE COMMENTS  1/2/18 03:00


     


 


 


 Insulin Aspart


  (NovoLOG


 SUPPLEMENTAL


 SCALE)  1  ACHS SLIDING  SCALE


 SQ


   1/2/18 08:00


    1/5/18 08:33


 


 


 Clonidine


  (Catapres)  0.2 mg  Q6H  PRN


 PO


 SBP>180, DBP>110  1/2/18 17:00


     


 


 


 Hydrochlorothiazide


  (Hydrodiuril)  50 mg  DAILY


 PO


   1/4/18 09:00


    1/5/18 08:34


 


 


 Oxycodone HCl


  (Roxicodone)  5 mg  Q4H  PRN


 PO


 pain 3-5  1/3/18 11:30


     


 


 


 Oxycodone HCl


  (Roxicodone)  10 mg  Q4H  PRN


 PO


 PAIN 6-10  1/3/18 12:00


    1/4/18 20:12


 


 


 Pantoprazole


 Sodium


  (Protonix)  40 mg  DAILY


 PO


   1/3/18 12:00


    1/5/18 08:34


 


 


 Senna/Docusate


 Sodium


  (Kaitlyn-Colace)  1 tab  BID


 PO


   1/3/18 12:00


    1/5/18 08:34


 


 


 Nifedipine


  (Procardia Xl)  60 mg  DAILY


 PO


   1/5/18 09:00


    1/5/18 08:34


 


 


 Diazepam


  (Valium)  5 mg  TID


 PO


   1/4/18 09:45


    1/5/18 08:34


 


 


 Ketorolac


 Tromethamine


  (Toradol Inj)  30 mg  Q8HR


 IV PUSH


   1/4/18 09:45


 1/9/18 09:44  1/5/18 05:57


 








 (Cira Pozo)


Current Medications





Current Medications


Labetalol HCl (Trandate Inj) 10 mg ONCE  ONCE IV PUSH  Last administered on 1/2/ 18at 00:56;  Start 1/2/18 at 00:45;  Stop 1/2/18 at 00:46;  Status DC


Morphine Sulfate (Morphine Inj) 4 mg ONCE  ONCE IV PUSH  Last administered on 1/ 2/18at 00:56;  Start 1/2/18 at 00:45;  Stop 1/2/18 at 00:46;  Status DC


Ondansetron HCl (Zofran Inj) 4 mg ONCE  ONCE IV PUSH  Last administered on 1/2/ 18at 00:57;  Start 1/2/18 at 00:45;  Stop 1/2/18 at 00:46;  Status DC


Iohexol (Omnipaque 350 Inj) 100 ml STK-MED ONCE IVCONTRAST  Last administered 

on 1/2/18at 01:26;  Start 1/2/18 at 01:26;  Stop 1/2/18 at 01:27;  Status DC


Nitroglycerin (Nitroglycerin 2% Oint) 1 inch ONCE  ONCE TOPICAL  Last 

administered on 1/2/18at 02:18;  Start 1/2/18 at 02:15;  Stop 1/2/18 at 02:16;  

Status DC


Aspirin (Aspirin Chew) 324 mg ONCE  ONCE CHEW  Last administered on 1/2/18at 02:

18;  Start 1/2/18 at 02:15;  Stop 1/2/18 at 02:16;  Status DC


Enalaprilat (Vasotec Inj) 1.25 mg ONCE  ONCE IV PUSH  Last administered on 1/2/ 18at 02:18;  Start 1/2/18 at 02:15;  Stop 1/2/18 at 02:16;  Status DC


Sodium Chloride (NS Flush) 2 ml UNSCH  PRN IV FLUSH FLUSH AFTER USING IV ACCESS

;  Start 1/2/18 at 03:00;  Stop 1/6/18 at 11:12;  Status DC


Sodium Chloride (NS Flush) 2 ml BID IV FLUSH  Last administered on 1/5/18at 21:

26;  Start 1/2/18 at 09:00;  Stop 1/6/18 at 11:12;  Status DC


Clonidine (Catapres) 0.1 mg Q6H  PRN PO SEE LABEL COMMENTS Last administered on 

1/2/18at 11:58;  Start 1/2/18 at 03:00;  Stop 1/2/18 at 16:47;  Status DC


Lisinopril (Prinivil) 20 mg Q12HR PO  Last administered on 1/5/18at 08:34;  

Start 1/2/18 at 09:00;  Stop 1/5/18 at 11:01;  Status DC


Hydrochlorothiazide (Hydrodiuril) 25 mg DAILY PO  Last administered on 1/3/18at 

08:39;  Start 1/2/18 at 09:00;  Stop 1/3/18 at 11:18;  Status DC


Nifedipine (Procardia Xl) 30 mg DAILY PO  Last administered on 1/4/18at 07:44;  

Start 1/2/18 at 09:00;  Stop 1/4/18 at 09:20;  Status DC


Lorazepam (Ativan Inj) 1 mg ONCE  ONCE IV PUSH  Last administered on 1/2/18at 03

:44;  Start 1/2/18 at 03:00;  Stop 1/2/18 at 03:15;  Status DC


Dextrose (D50w (Vial) Inj) 50 ml UNSCH  PRN IV PUSH HYPOGLYCEMIA-SEE COMMENTS;  

Start 1/2/18 at 03:00;  Stop 1/6/18 at 11:12;  Status DC


Glucagon (Glucagon Inj) 1 mg UNSCH  PRN OTHER HYPOGLYCEMIA-SEE COMMENTS;  Start 

1/2/18 at 03:00;  Stop 1/6/18 at 11:12;  Status DC


Insulin Aspart (NovoLOG SUPPLEMENTAL SCALE) 1 ACHS SLIDING  SCALE SQ  Last 

administered on 1/5/18at 21:30;  Start 1/2/18 at 08:00;  Stop 1/6/18 at 11:12;  

Status DC


Hydralazine HCl (Apresoline Inj) 10 mg ONCE  ONCE IV PUSH  Last administered on 

1/2/18at 03:44;  Start 1/2/18 at 03:30;  Stop 1/2/18 at 03:31;  Status DC


Lorazepam (Ativan Inj) 1 mg ONCE IV PUSH  Last administered on 1/2/18at 10:15;  

Start 1/2/18 at 09:30;  Stop 1/2/18 at 12:00;  Status DC


Gadodiamide (Omniscan Pf Inj) 25 ml STK-MED ONCE IVCONTRAST  Last administered 

on 1/2/18at 11:53;  Start 1/2/18 at 11:53;  Stop 1/2/18 at 11:54;  Status DC


Clonidine (Catapres) 0.2 mg Q6H  PRN PO SBP>180, DBP>110;  Start 1/2/18 at 17:00

;  Stop 1/6/18 at 11:12;  Status DC


Hydrochlorothiazide (Hydrodiuril) 50 mg DAILY PO  Last administered on 1/5/18at 

08:34;  Start 1/4/18 at 09:00;  Stop 1/5/18 at 10:56;  Status DC


Hydrochlorothiazide (Hydrodiuril) 25 mg ONCE  ONCE PO  Last administered on 1/3/

18at 12:24;  Start 1/3/18 at 12:00;  Stop 1/3/18 at 12:01;  Status DC


Oxycodone HCl (Roxicodone) 5 mg Q4H  PRN PO pain 3-5;  Start 1/3/18 at 11:30;  

Stop 1/6/18 at 11:12;  Status DC


Oxycodone HCl (Roxicodone) 10 mg Q4H  PRN PO PAIN 6-10 Last administered on 1/4/ 18at 20:12;  Start 1/3/18 at 12:00;  Stop 1/6/18 at 11:12;  Status DC


Cyclobenzaprine HCl (Flexeril) 10 mg ONCE  ONCE PO  Last administered on 1/3/

18at 12:25;  Start 1/3/18 at 11:30;  Stop 1/3/18 at 11:31;  Status DC


Cyclobenzaprine HCl (Flexeril) 10 mg Q8H  PRN PO muscle spasm;  Start 1/3/18 at 

11:30;  Stop 1/4/18 at 09:40;  Status DC


Pantoprazole Sodium (Protonix) 40 mg DAILY PO  Last administered on 1/6/18at 07:

41;  Start 1/3/18 at 12:00;  Stop 1/6/18 at 11:12;  Status DC


Senna/Docusate Sodium (Kaitlyn-Colace) 1 tab BID PO  Last administered on 1/6/18at 

07:41;  Start 1/3/18 at 12:00;  Stop 1/6/18 at 11:12;  Status DC


Nifedipine (Procardia Xl) 60 mg DAILY PO  Last administered on 1/6/18at 07:41;  

Start 1/5/18 at 09:00;  Stop 1/6/18 at 11:12;  Status DC


Nifedipine (Procardia Xl) 30 mg ONCE  ONCE PO  Last administered on 1/4/18at 10:

22;  Start 1/4/18 at 09:30;  Stop 1/4/18 at 09:31;  Status DC


Diazepam (Valium) 5 mg TID PO  Last administered on 1/6/18at 07:41;  Start 1/4/ 18 at 09:45;  Stop 1/6/18 at 11:12;  Status DC


Ketorolac Tromethamine (Toradol Inj) 30 mg Q8HR IV PUSH  Last administered on 1/ 5/18at 05:57;  Start 1/4/18 at 09:45;  Stop 1/5/18 at 10:56;  Status DC


Sodium Chloride 1,000 ml @  999 mls/hr BOLUS  ONCE IV  Last administered on 1/5/ 18at 11:49;  Start 1/5/18 at 11:00;  Stop 1/5/18 at 12:00;  Status DC


Sodium Chloride 1,000 ml @  75 mls/hr [id number] IV  Last administered on 1/6/18at 

02:37;  Start 1/5/18 at 12:00;  Stop 1/6/18 at 11:12;  Status DC


Hydralazine HCl (Apresoline) 25 mg Q8HR PO  Last administered on 1/6/18at 05:50

;  Start 1/5/18 at 14:00;  Stop 1/6/18 at 11:12;  Status DC


Insulin Detemir (Levemir Inj) 15 units DAILY SQ  Last administered on 1/6/18at 

07:41;  Start 1/5/18 at 11:15;  Stop 1/6/18 at 11:12;  Status DC


 (Hebert Maldonado MD)





Medical Decision Making


MDM Remarks


46 y/o male with brain mass


follow up MRI Brain with stable 8 mm nonenhancing lesion, prob hemosiderin 

deposition,  


MRA head and previous CTA Head negative for aneurysm or AVMs


Hypertensive crisis


 (Cira Pozo)





Plan


Plan Remarks


MRI Brain reviewed to be nonenhancing lesion, no evidence of aneurysm or AVM on 

MRA


again discussed with MrChadd Bright the alternatives of treatment, including the 

possibility of stereotactic biopsy of the lesion vs nonsurgical management,


patient continues to requests nonsurgical management at this time


cont medical management 


clear for dc from NRS standpoint


 


 (Cira Pozo)





Attending Statement


Continue neuro checks





Continue nonoperative conservative follow-up





Cleared for discharge





The exam, history, and the medical decision-making described in the above note 

were completed with the assistance of the mid-level provider. I reviewed and 

agree with the findings presented.  I attest that I had a face-to-face 

encounter with the patient on the same day, and personally performed and 

documented my assessment and findings in the medical record.


 (Hebert Maldonado MD)











Cira Pozo Jan 5, 2018 10:23


Hebert Maldonado MD Jan 7, 2018 14:11

## 2018-01-05 NOTE — HHI.PR
Subjective


Remarks


The patient said that he feels a lot better and was looking forward to going 

home.  He has been ambulatory.  Pain in his shoulder and flank were improved.





Objective


Vitals





Vital Signs








  Date Time  Temp Pulse Resp B/P (MAP) Pulse Ox O2 Delivery O2 Flow Rate FiO2


 


1/5/18 08:14 97.6 71 18 133/71 (91) 95   


 


1/5/18 08:00  95      


 


1/5/18 04:00 97.6 66 18 140/63 (88) 98   


 


1/5/18 00:00 97.6 73 19 161/80 (107) 98   


 


1/4/18 20:44  114      


 


1/4/18 20:00 98.3 82 20 147/88 (107) 97   


 


1/4/18 16:33 97.9 72 20 132/74 (93) 97   


 


1/4/18 12:04 97.3 62 20 153/70 (97) 98   


 


1/4/18 11:23   19     














I/O      


 


 1/4/18 1/4/18 1/4/18 1/5/18 1/5/18 1/5/18





 07:00 15:00 23:00 07:00 15:00 23:00


 


Intake Total   1440 ml   


 


Balance   1440 ml   


 


      


 


Intake Oral   1440 ml   


 


# Voids 4  4 4  


 


# Bowel Movements 1  1 0  








Result Diagram:  


1/5/18 0740                                                                    

            1/5/18 0740





Imaging





Last Impressions








Shoulder X-Ray 1/3/18 0000 Signed





Impressions: 





 Service Date/Time:  Wednesday, January 3, 2018 13:04 - CONCLUSION:  

Significant 





 subacromial spurring, no fracture     Trev Stark MD  FACR


 


Head CT 1/2/18 0041 Signed





Impressions: 





 Service Date/Time:  Tuesday, January 2, 2018 01:19 - CONCLUSION:  1. Unchanged 





 hyperdense lesion involving anterior corpus callosum previously evaluated with 





 MRI.  2. No acute intracranial abnormality.     Lei Soria Jr., MD 


 


Abdomen/Pelvis CT 1/2/18 0041 Signed





Impressions: 





 Service Date/Time:  Tuesday, January 2, 2018 01:22 - CONCLUSION:  1. No acute 





 abnormality. 2. Hepatic steatosis.     Lei Soria Jr., MD 


 


Head Magnetic Resonance Angiography 1/2/18 0000 Signed





Impressions: 





 Service Date/Time:  Tuesday, January 2, 2018 10:40 - CONCLUSION:  Normal 





 examination for a patient of this age.       Blane De Leon MD 


 


Brain MRI 1/2/18 0000 Signed





Impressions: 





 Service Date/Time:  Tuesday, January 2, 2018 10:40 - CONCLUSION:  1. 





 Nonenhancing 8mm lesion in the genu of the corpus callosum associated 





 hemosiderin deposition, probably chronic small vascular malformation such as 





 cavernous angioma. No change from June 2017. No mass effect or shift.     

Blane De Leon MD 


 


Chest X-Ray 1/1/18 5998 Signed





Impressions: 





 Service Date/Time:  Tuesday, January 2, 2018 00:05 - CONCLUSION:  1. Mild 





 cardiomegaly. Clear lungs.     Lei Soria Jr., MD 








Objective Remarks


GENERAL: Obese male in NAD.


SKIN: No rashes, ecchymoses or lesions. Cool and dry.


HEAD: Atraumatic. Normocephalic. No temporal or scalp tenderness.


EYES: Pupils equal round and reactive. Extraocular motions intact. No scleral 

icterus. No injection or drainage. 


ENT: Nose without bleeding, purulent drainage or septal hematoma. Throat 

without erythema, tonsillar hypertrophy or exudate. Uvula midline. Airway 

patent.


NECK: Trachea midline. No JVD or lymphadenopathy. Supple, nontender, no 

meningeal signs.


CARDIOVASCULAR: Regular rate and rhythm without murmurs, gallops, or rubs. 


RESPIRATORY: Clear to auscultation. Breath sounds equal bilaterally. No wheezes

, rales, or rhonchi.  


GASTROINTESTINAL: Abdomen soft, non-tender, nondistended. No hepato-splenomegaly

, or palpable masses. No guarding. Left sided flank tenderness, no CVA 

tenderness.


MUSCULOSKELETAL: Right shoulder tender to palpation anteriorly. Restricted ROM s

/t pain. 1+ edema in the lower extremities. Tenderness to palpation of left 

lower back.


NEUROLOGICAL: Awake and alert. Cranial nerves II through XII intact.  Motor and 

sensory grossly within normal limits.  Normal speech.


PSYCH: Mood and affect appropriate.


Medications and IVs





Current Medications








 Medications


  (Trade)  Dose


 Ordered  Sig/Collin


 Route  Start Time


 Stop Time Status Last Admin


 


  (NS Flush)  2 ml  UNSCH  PRN


 IV FLUSH  1/2/18 03:00


     


 


 


  (NS Flush)  2 ml  BID


 IV FLUSH  1/2/18 09:00


    1/5/18 08:34


 


 


  (D50w (Vial) Inj)  50 ml  UNSCH  PRN


 IV PUSH  1/2/18 03:00


     


 


 


  (Glucagon Inj)  1 mg  UNSCH  PRN


 OTHER  1/2/18 03:00


     


 


 


  (NovoLOG


 SUPPLEMENTAL


 SCALE)  1  ACHS SLIDING  SCALE


 SQ  1/2/18 08:00


    1/5/18 08:33


 


 


  (Catapres)  0.2 mg  Q6H  PRN


 PO  1/2/18 17:00


     


 


 


  (Roxicodone)  5 mg  Q4H  PRN


 PO  1/3/18 11:30


     


 


 


  (Roxicodone)  10 mg  Q4H  PRN


 PO  1/3/18 12:00


    1/4/18 20:12


 


 


  (Protonix)  40 mg  DAILY


 PO  1/3/18 12:00


    1/5/18 08:34


 


 


  (Kaitlyn-Colace)  1 tab  BID


 PO  1/3/18 12:00


    1/5/18 08:34


 


 


  (Procardia Xl)  60 mg  DAILY


 PO  1/5/18 09:00


    1/5/18 08:34


 


 


  (Valium)  5 mg  TID


 PO  1/4/18 09:45


    1/5/18 08:34


 


 


 Sodium Chloride  1,000 ml @ 


 999 mls/hr  BOLUS  ONCE


 IV  1/5/18 11:00


 1/5/18 12:00   


 


 


 Sodium Chloride  1,000 ml @ 


 75 mls/hr  [id number]


 IV  1/5/18 11:15


 1/6/18 00:34 UNV  


 


 


  (Apresoline)  25 mg  Q8HR


 PO  1/5/18 14:00


   UNV  


 











A/P


Assessment and Plan


Hypertensive crisis


Patient is status post Vasotec and labetalol in the ED. He ran out of his home 

meds 30 days ago.  Blood pressure improved.


- Increased Procardia to 60 mg daily.  DC hydrochlorothiazide and lisinopril in 

the setting of renal failure.  Start hydralazine 25 mg every 8 hours.


- Clonidine when necessary.





Brain lesion


Patient with previous evaluation of brain lesion with MRI that showed possible 

venous malformation or hemangioma and a CTA head that showed possible CNS 

lymphoma versus glioblastoma. Patient was noncompliant with follow-up. 

Previously evaluated by neurosurgery who recommended biopsy versus repeat MRI 

in 3 months. Repeat MRI stable.


- follow up with neurosurgery. The pt prefers nonoperative management.  Cleared 

for discharge by neurosurgery.





Chest pain


He has shoulder pain and flank pain. Not complaining of chest pain. Trops flat. 

EKG showed normal sinus rhythm without ST segment elevations or depressions.


- treat shoulder and flank pain





Type 2 diabetes mellitus


Well controlled.


- Holding home metformin.


- Sliding scale insulin.


- Levemir 15 units daily.





Shoulder and flank pain


Likely musculoskeletal in origin as he started to develop them while working 

and pain is worse with movement. CT abdomen/pelvis negative. Shoulder x ray 

with significant subacromial spurring. The pt has lower left back pain on exam, 

negative SLR on that side.  Improved.


- OT for right shoulder pain.


- physical therapy.


- pain control with a bowel regimen. 


- standing Valium.  DC Toradol in the setting of renal failure.





Acute renal failure


Possibly secondary to use of Toradol, chlorothiazide and lisinopril.


- Discontinue above medications.


- IV fluid bolus followed by infusion.


- Follow BMP in the morning.





PPx: SCDs


Discharge Planning


Discharge home tomorrow if creatinine is improved and blood pressure remains 

controlled on new regimen











Carson Ibrahim DO Jan 5, 2018 11:08

## 2018-01-05 NOTE — RADRPT
EXAM DATE/TIME:  01/05/2018 11:25 

 

[facility] COMPARISON:     

No previous studies available for comparison.

        

 

 

INDICATIONS :     

Abnormal labs.

                     

 

MEDICAL HISTORY :           

Diplopia. Headaches. Hypertension. Sleep apnea. Diabetes. Brain lesion. Acute renal failure.

 

SURGICAL HISTORY :     

None.     

 

ENCOUNTER:     

Initial

 

ACUITY:     

1 day

 

PAIN SCORE:     

0/10

 

LOCATION:     

Bilateral flank 

MEASUREMENTS:     

 

RIGHT KIDNEY:     

8.9 x 4.3 x 4.8 cm

 

LEFT KIDNEY:     

11.2 x 5.7 x 4.1 cm

 

FINDINGS:     

 

RIGHT KIDNEY:     

Renal cortex is normal in thickness and echotexture.  No hydronephrosis, stone, or mass.  

 

LEFT KIDNEY:     

Renal cortex is normal in thickness and echotexture.  No hydronephrosis, stone, or mass.  1.7 cm cyst
 lower pole

 

BLADDER:     

Within normal limits given the degree of distension.  

 

CONCLUSION:     

1. No acute findings. No hydronephrosis. 1.7 cm left renal cyst.

 

 

 

 Blane De Leon MD on January 05, 2018 at 12:15           

Board Certified Radiologist.

 This report was verified electronically.

## 2018-01-06 VITALS
TEMPERATURE: 98 F | OXYGEN SATURATION: 100 % | HEART RATE: 77 BPM | RESPIRATION RATE: 18 BRPM | SYSTOLIC BLOOD PRESSURE: 133 MMHG | DIASTOLIC BLOOD PRESSURE: 72 MMHG

## 2018-01-06 VITALS
HEART RATE: 76 BPM | OXYGEN SATURATION: 100 % | RESPIRATION RATE: 20 BRPM | SYSTOLIC BLOOD PRESSURE: 147 MMHG | TEMPERATURE: 97.5 F | DIASTOLIC BLOOD PRESSURE: 73 MMHG

## 2018-01-06 VITALS
SYSTOLIC BLOOD PRESSURE: 157 MMHG | OXYGEN SATURATION: 97 % | DIASTOLIC BLOOD PRESSURE: 82 MMHG | RESPIRATION RATE: 18 BRPM | HEART RATE: 78 BPM | TEMPERATURE: 97.8 F

## 2018-01-06 VITALS — HEART RATE: 88 BPM

## 2018-01-06 VITALS — HEART RATE: 87 BPM

## 2018-01-06 LAB
BUN SERPL-MCNC: 31 MG/DL (ref 7–18)
CALCIUM SERPL-MCNC: 8.6 MG/DL (ref 8.5–10.1)
CHLORIDE SERPL-SCNC: 100 MEQ/L (ref 98–107)
CREAT SERPL-MCNC: 1.29 MG/DL (ref 0.6–1.3)
GFR SERPLBLD BASED ON 1.73 SQ M-ARVRAT: 72 ML/MIN (ref 89–?)
GLUCOSE SERPL-MCNC: 166 MG/DL (ref 74–106)
HCO3 BLD-SCNC: 29.7 MEQ/L (ref 21–32)
MAGNESIUM SERPL-MCNC: 2 MG/DL (ref 1.5–2.5)
SODIUM SERPL-SCNC: 136 MEQ/L (ref 136–145)

## 2018-01-06 RX ADMIN — PANTOPRAZOLE SCH MG: 40 TABLET, DELAYED RELEASE ORAL at 07:41

## 2018-01-06 RX ADMIN — PHENYTOIN SODIUM SCH MLS/HR: 50 INJECTION INTRAMUSCULAR; INTRAVENOUS at 02:37

## 2018-01-06 RX ADMIN — DIAZEPAM SCH MG: 5 TABLET ORAL at 07:41

## 2018-01-06 RX ADMIN — NIFEDIPINE SCH MG: 60 TABLET, FILM COATED, EXTENDED RELEASE ORAL at 07:41

## 2018-01-06 RX ADMIN — Medication SCH ML: at 07:40

## 2018-01-06 RX ADMIN — INSULIN ASPART SCH: 100 INJECTION, SOLUTION INTRAVENOUS; SUBCUTANEOUS at 07:40

## 2018-01-06 RX ADMIN — PHENYTOIN SODIUM SCH MLS/HR: 50 INJECTION INTRAMUSCULAR; INTRAVENOUS at 01:20

## 2018-01-06 RX ADMIN — STANDARDIZED SENNA CONCENTRATE AND DOCUSATE SODIUM SCH TAB: 8.6; 5 TABLET, FILM COATED ORAL at 07:41

## 2018-01-06 RX ADMIN — ACYCLOVIR SCH UNITS: 800 TABLET ORAL at 07:41

## 2018-01-06 NOTE — HHI.DCPOC
Discharge Care Plan


Diagnosis:  


(1) Acute renal failure


(2) Muscle spasm


(3) Bone spur


(4) Hypertensive urgency


(5) Intracranial mass


Goals to Promote Your Health


* To prevent worsening of your condition and complications


* To maintain your health at the optimal level


Directions to Meet Your Goals


*** Take your medications as prescribed


*** Follow your dietary instruction


*** Follow activity as directed








*** Keep your appointments as scheduled


*** Take your immunizations and boosters as scheduled


*** If your symptoms worsen call your PCP, if no PCP go to Urgent Care Center 

or Emergency Room***


*** Smoking is Dangerous to Your Health. Avoid second hand smoke***


***Call the 24-hour hour crisis hotline for domestic abuse at 1-219.947.9083***











Carson Ibrahim DO Jan 6, 2018 09:09

## 2018-01-06 NOTE — HHI.DS
__________________________________________________





Discharge Summary


Admission Date


Jan 4, 2018 at 13:27


Discharge Date:  Jan 6, 2018


Admitting Diagnosis





Hypertensive urgency, chest pain





(1) Bone spur


ICD Code:  M77.9 - Enthesopathy, unspecified


Diagnosis:  Principal





(2) Acute renal failure


ICD Code:  N17.9 - Acute kidney failure, unspecified


Diagnosis:  Principal





(3) Muscle spasm


ICD Code:  M62.838 - Other muscle spasm


Diagnosis:  Principal





(4) Intracranial mass


ICD Code:  R90.0 - Intracranial mass


Status:  Acute


(5) Hypertensive urgency


ICD Code:  I16.0 - Hypertensive urgency


Diagnosis:  Principal


Status:  Acute


(6) Chest pain


ICD Code:  R07.9 - Chest pain, unspecified


Diagnosis:  Principal


Status:  Acute


Procedures


None


Brief History - From Admission


47-year-old male with a past medical history significant for uncontrolled 

hypertension, type 2 diabetes mellitus and a brain lesion initially diagnosed 

in June of this year presents to the emergency department complaining of right 

shoulder and left flank pain.  The patient reports he feels as if the pain in 

his shoulder is "in his joint."  He has full range of motion of his shoulder 

however experiences pain with movement.  He also complains of left-sided flank 

pain.  Creatinine 1.1, which is baseline for the patient.  CT of the abdomen 

and pelvis with no acute findings.  The patient has a known right frontal per 

midline mass previously evaluated in June of this year as a possible venous 

malformation or hemangioma versus CNS lymphoma or glioblastoma.  The patient 

elected to not have the mass biopsied at that time and was supposed to follow-

up with neurosurgery and a brain MRI 3 months after discharge.  He reports he 

was unable to do this secondary to losing his insurance.  He denies any 

headaches at this time but does state he does have intermittent migraines.  

Patient was found to be hypertensive in the ED with a blood pressure of 250/

111.  He reports noncompliance with his blood pressure medications secondary to 

financial restraints and not having a PCP at this time.  While being evaluated 

in the emergency department, the patient started to experience chest pain/

pressure.  He states the pain is still present and feels like a squeezing 

sensation.  Initial troponin negative.  EKG shows normal sinus rhythm without 

ST segment elevations or depressions.


CBC/BMP:  


1/5/18 0740                                                                    

            1/6/18 0721





Significant Findings





Laboratory Tests








Test


  1/5/18


07:40 1/6/18


07:21


 


Blood Urea Nitrogen


  38 MG/DL


(7-18) 31 MG/DL


(7-18)


 


Creatinine


  1.65 MG/DL


(0.60-1.30) 


 


 


Random Glucose


  161 MG/DL


() 166 MG/DL


()


 


Sodium Level


  131 MEQ/L


(136-145) 


 


 


Chloride Level


  94 MEQ/L


() 


 


 


Estimat Glomerular Filtration


Rate 54 ML/MIN


(>89) 72 ML/MIN


(>89)








Imaging





Last Impressions








Renal Ultrasound 1/5/18 0000 Signed





Impressions: 





 Service Date/Time:  Friday, January 5, 2018 11:25 - CONCLUSION:  1. No acute 





 findings. No hydronephrosis. 1.7 cm left renal cyst.     Blane De Leon MD 


 


Shoulder X-Ray 1/3/18 0000 Signed





Impressions: 





 Service Date/Time:  Wednesday, January 3, 2018 13:04 - CONCLUSION:  

Significant 





 subacromial spurring, no fracture     Trev Stark MD  FACR


 


Head CT 1/2/18 0041 Signed





Impressions: 





 Service Date/Time:  Tuesday, January 2, 2018 01:19 - CONCLUSION:  1. Unchanged 





 hyperdense lesion involving anterior corpus callosum previously evaluated with 





 MRI.  2. No acute intracranial abnormality.     Lei Soria Jr., MD 


 


Abdomen/Pelvis CT 1/2/18 0041 Signed





Impressions: 





 Service Date/Time:  Tuesday, January 2, 2018 01:22 - CONCLUSION:  1. No acute 





 abnormality. 2. Hepatic steatosis.     Lei Soria Jr., MD 


 


Head Magnetic Resonance Angiography 1/2/18 0000 Signed





Impressions: 





 Service Date/Time:  Tuesday, January 2, 2018 10:40 - CONCLUSION:  Normal 





 examination for a patient of this age.       Blane De Leon MD 


 


Brain MRI 1/2/18 0000 Signed





Impressions: 





 Service Date/Time:  Tuesday, January 2, 2018 10:40 - CONCLUSION:  1. 





 Nonenhancing 8mm lesion in the genu of the corpus callosum associated 





 hemosiderin deposition, probably chronic small vascular malformation such as 





 cavernous angioma. No change from June 2017. No mass effect or shift.     

Blane De Leon MD 


 


Chest X-Ray 1/1/18 4323 Signed





Impressions: 





 Service Date/Time:  Tuesday, January 2, 2018 00:05 - CONCLUSION:  1. Mild 





 cardiomegaly. Clear lungs.     Lei Soria Jr., MD 








PE at Discharge


GENERAL: Obese male in NAD.


SKIN: No rashes, ecchymoses or lesions. Cool and dry.


HEAD: Atraumatic. Normocephalic. No temporal or scalp tenderness.


EYES: Pupils equal round and reactive. Extraocular motions intact. No scleral 

icterus. No injection or drainage. 


ENT: Nose without bleeding, purulent drainage or septal hematoma. Throat 

without erythema, tonsillar hypertrophy or exudate. Uvula midline. Airway 

patent.


NECK: Trachea midline. No JVD or lymphadenopathy. Supple, nontender, no 

meningeal signs.


CARDIOVASCULAR: Regular rate and rhythm without murmurs, gallops, or rubs. 


RESPIRATORY: Clear to auscultation. Breath sounds equal bilaterally. No wheezes

, rales, or rhonchi.  


GASTROINTESTINAL: Abdomen soft, non-tender, nondistended. No hepato-splenomegaly

, or palpable masses. No guarding. Left sided flank tenderness, no CVA 

tenderness.


MUSCULOSKELETAL: Right shoulder slightly tender to palpation anteriorly. 

Restricted ROM s/t pain. 1+ edema in the lower extremities. 


NEUROLOGICAL: Awake and alert. Cranial nerves II through XII intact.  Motor and 

sensory grossly within normal limits.  Normal speech.


PSYCH: Mood and affect appropriate.


Pt update on day of discharge


The patient was feeling well and stated that he wanted to go back to work soon.

  He said that his pain was well-controlled.  He has been tolerating a diet.


Hospital Course


Hypertensive crisis/ chest pain


Troponins were flat. EKG showed normal sinus rhythm without ST segment 

elevations or depressions.  Chest x-ray significant for mild cardiomegaly.  The 

patient received IV Vasotec and labetalol in the ED. He ran out of his home 

meds 30 days ago.  We continued hydrochlorothiazide and lisinopril.  We 

increased Procardia to 60 mg daily.  We increased hydrochlorothiazide to 50 mg 

daily.  Chest pain resolved.  Hydrochlorothiazide and lisinopril were then 

discontinued secondary to renal failure.  We started hydralazine 25 mg every 8 

hours.  The patient received clonidine when necessary.  The patient's blood 

pressure improved.  He will be discharged on hydralazine and the increased dose 

of Procardia.





Brain lesion


Patient with previous evaluation of brain lesion with MRI that showed possible 

venous malformation or hemangioma and a CTA head that showed possible CNS 

lymphoma versus glioblastoma. Patient was noncompliant with follow-up. 

Previously evaluated by neurosurgery who recommended biopsy versus repeat MRI 

in 3 months. Repeat MRI on this hospitalization was stable.  Neurosurgery was 

consulted and cleared the patient for discharge.  He will follow-up with 

neurosurgery as an outpatient.





Type 2 diabetes mellitus


We held the patient's home metformin and started a sliding scale along with 

Levemir 15 units daily.  He will resume his home regimen upon discharge.





Shoulder and flank pain


Likely musculoskeletal in origin as he started to develop them while working 

and pain is worse with movement. CT abdomen/pelvis negative. Shoulder x ray 

with significant subacromial spurring. The pt has lower left back pain on exam, 

negative SLR on that side.  He received occupational therapy for the right 

shoulder pain.  We also had the patient work with physical therapy.  The 

patient received pain control with a bowel regimen.  He was started on standing 

Toradol and standing Valium.  We discontinued Toradol in the setting of renal 

failure.  His symptoms improved.  He was given a note from work.  He will be 

discharged with oxycodone and Valium.





Acute renal failure


We discontinued Toradol, lisinopril and hydrochlorothiazide.  Renal ultrasound 

was significant only for a 1.7 cm renal cyst on the left.  He received an IV 

fluid bolus followed by infusion.  His creatinine improved. He will have a 

repeat BMP in one week.


Pt Condition on Discharge:  Good


Discharge Disposition:  Discharge Home


Discharge Time:  > 30 minutes


Discharge Instructions


DIET: Follow Instructions for:  Heart Healthy Diet


Activities you can perform:  Weight Bearing as Adam


Follow up Referrals:  


Neurosurgery - 2 Weeks with Hebert Maldonado MD


PCP Follow-up - 1 Week





New Orders:  


BASIC METABOLIC PROF - 1 Week





New Medications:  


Diazepam (Valium) 5 Mg Tab


5 MG PO TID PRN for muscle spasm, #20 TAB





Hydralazine HCl (Hydralazine HCl) 25 Mg Tablet


25 MG PO Q8HR for Blood Pressure Management, #90 TAB 1 Refill





Nifedipine ER 24 HR (Nifedipine ER 24 HR) 60 Mg Tab


60 MG PO DAILY for Blood Pressure Management, #30 TAB 1 Refill





Oxycodone (Oxycodone) 5 Mg Tab


5 MG PO Q6HR PRN for PAIN SCALE 1 TO 10, #20 TAB





 


Continued Medications:  


Metformin (Metformin) 500 Mg Tab


500 MG PO BIDPC for Blood Sugar Management, #60 TAB 0 Refills


With meals


 


Discontinued Medications:  


Hydrochlorothiazide (Hydrochlorothiazide) 25 Mg Tab


25 MG PO DAILY, #30 TAB 0 Refills





[Lisinopril] () 20 MG TAB


20 MG PO BID for HTN, #60 TAB 0 Refills





[NIFEdipine SR] () 30 MG TABCR


30 MG PO DAILY for HTN, #30 TAB.SR 0 Refills

















Carson Ibrahim DO Jan 6, 2018 09:32

## 2023-08-16 NOTE — HHI.NSPN
Message left for pt with request for return call regarding last day of Coumadin is 9/17 prior to his MVR/CABG 8/23.    __________________________________________________ (Cira Pozo)





Note Status


Status:  Progress Note (Cira Pozo)





Interval History


Interval History


Mr. Norwood is a 47-year-old male who presents to [facility] ED today with 

complaints of headaches.  The history was obtained with the aid of his wife at 

bedside as the patient received pain medication and is drowsy.  His wife 

reports the patient had complained of headaches located behind the right eye 

this morning.  It was severe enough that he presented to the ED for further 

evaluation.  His wife reports he has had headaches previously that comes and 

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  An MRI of brain was obtained 

which showed a mass in the right paramidline frontal possible vascular 

malformation versus cavernous angioma.  The patient denies focal weakness, 

double vision, seizures, nausea, vomiting.  He reports no personal history of 

aneurysm, tumors or cancers.  A neurosurgical evaluation was requested. 





6/3: headaches resolved, denies focal weakness, vomiting, seizures, vision 

changes


6/4: stable overnight, Oncology evaluation noted. 


6/5: no new neurological complaints. 


6/6: denies headaches, managing bp per medical physician.  (Cira Pozo)





Labs, Micro, & Vital Signs


Results











  Date Time  Temp Pulse Resp B/P Pulse Ox O2 Delivery O2 Flow Rate FiO2


 


6/6/17 11:39 96.9 69 18 156/91 100   


 


6/6/17 07:58 96.9 65 18 125/58 100   


 


6/6/17 04:29 96.4 71 20 147/59 97   


 


6/6/17 00:22 96.9 73 20 137/65 99   


 


6/5/17 21:09 97.4 79 20 152/71 96   


 


6/5/17 16:51 97.9 78 18 136/72 100   














 6/6/17





 07:00


 


Intake Total 480 ml


 


Balance 480 ml








Constitutional





Vital Signs








  Date Time  Temp Pulse Resp B/P Pulse Ox O2 Delivery O2 Flow Rate FiO2


 


6/6/17 11:39 96.9 69 18 156/91 100   


 


6/6/17 07:58 96.9 65 18 125/58 100   


 


6/6/17 04:29 96.4 71 20 147/59 97   


 


6/6/17 00:22 96.9 73 20 137/65 99   


 


6/5/17 21:09 97.4 79 20 152/71 96   


 


6/5/17 16:51 97.9 78 18 136/72 100   














 6/6/17





 07:00


 


Intake Total 480 ml


 


Balance 480 ml





 (Cira Pozo)





Review of Systems/Exam


Exam


Mr. Norwood is alert, oriented to time, place and person. Speech is fluent.  

Follows commands well.  





Sitting up in chair. 





Cranial nerve examination: pupils 2-3 equal, round, and reactive to light. Extra

-ocular movements are intact. Facial motor and sensory function are normal and 

symmetrical. 





Neck is soft and supple.   No meningismus or nuchal rigidity. 





Motor: moves both upper and lower extremities symmetrically





Sensory examination is intact to light touch in both the upper and lower 

extremities, symmetrically.  





Cerebellar examination is intact to finger-to-nose test  (Cira Pozo)





Medications


Current Medications





Current Medications








 Medications


  (Trade)  Dose


 Ordered  Sig/Collin


 Route


 PRN Reason  Start Time


 Stop Time Status Last Admin


Dose Admin


 


 Sodium Chloride


  (NS Flush)  2 ml  UNSCH  PRN


 IV FLUSH


 FLUSH AFTER USING IV ACCESS  6/2/17 14:00


     


 


 


 Sodium Chloride


  (NS Flush)  2 ml  BID


 IV FLUSH


   6/2/17 21:00


    6/6/17 09:03


 


 


 Acetaminophen


  (Tylenol)  650 mg  Q6H  PRN


 PO


 PAIN 1-10 AND/OR FEVER >101F  6/2/17 14:00


    6/3/17 19:47


 


 


 Ondansetron HCl


  (Zofran Inj)  4 mg  Q6H  PRN


 IV


 NAUSEA OR VOMITING  6/2/17 14:00


     


 


 


 Miscellaneous


 Information  1  Q361D


 XX


   6/2/17 14:00


    6/2/17 14:00


 


 


 Chlorhexidine


 Gluconate


  (Chlorhexidine


 2% Cloth)  3 pack


 Taper  DAILY@04


 TOP


   6/3/17 04:00


 5/30/18 03:59  6/4/17 03:50


 


 


 Chlorhexidine


 Gluconate


  (Chlorhexidine


 2% Cloth)  3 pack  UNSCH  PRN


 TOP


 HYGIENIC CARE  6/2/17 14:00


     


 


 


 Senna/Docusate


 Sodium


  (Kaitlyn-Colace)  1 tab  BID


 PO


   6/2/17 21:00


     


 


 


 Magnesium


 Hydroxide


  (Milk Of


 Magnesia Liq)  30 ml  Q12H  PRN


 PO


 MILD - MODERATE CONSTIPATION  6/2/17 14:00


     


 


 


 Sennosides


  (Senokot)  17.2 mg  Q12H  PRN


 PO


 MODERATE - SEVERE CONSTIPATION  6/2/17 14:00


     


 


 


 Bisacodyl


  (Dulcolax Supp)  10 mg  DAILY  PRN


 RECTAL


 SEVERE CONSITIPATION  6/2/17 14:00


     


 


 


 Lactulose


  (Lactulose Liq)  30 ml  DAILY  PRN


 PO


 SEVERE CONSITIPATION  6/2/17 14:00


     


 


 


 Insulin Aspart


  (NovoLOG


 SUPPLEMENTAL


 SCALE)  1  Q6HR


 SQ


   6/2/17 18:00


    6/6/17 06:31


 


 


 Dextrose


  (D50w (Vial) Inj)  25 ml  UNSCH  PRN


 IV


 HYPOGLYCEMIA-SEE COMMENTS  6/2/17 14:15


     


 


 


 Glucagon


  (Glucagon Inj)  1 mg  UNSCH  PRN


 IM/SQ


 HYPOGLYCEMIA-SEE COMMENTS  6/2/17 14:15


     


 


 


 Hydrochlorothiazide


  (Hydrodiuril)  25 mg  DAILY


 PO


   6/2/17 18:00


    6/6/17 08:58


 


 


 Pantoprazole


 Sodium


  (Protonix)  40 mg  DAILY


 PO


   6/6/17 09:00


    6/6/17 08:57


 


 


 Lisinopril


  (Prinivil)  20 mg  BID


 PO


   6/5/17 21:00


    6/6/17 08:59


 


 


 Nifedipine


  (Procardia Xl)  30 mg  DAILY


 PO


   6/6/17 09:00


    6/6/17 08:59


 





 (Cira Pozo)





Medical Decision Making


MDM Remarks


46 y/o male presented with intractable headaches, improved


MRI Brain showed right fontal paramidline mass, poss venous malformation or 

hemangioma


CTA Head reports no aneurysm, but possible CNS lymphoma, glioblastoma (Cira Pozo)





Plan


Plan Remarks


discussed with patient treatment options of brain biopsy vs watching with 

repeat MRI in 3 months


pt requests nonsurgical mgt for now


repeat MRI Brain w/wo contrast in 3 months and f/u outpatient


dw patient again treatment plan, he understands and questions answered


clear for dc from NRS standpoint when blood pressure improves


cont mgt per hospitalist for hypertension


f/u outpatient (Cira Pozo)





Attending Statement


The exam, history, and the medical decision-making described in the above note 

were completed with the assistance of the mid-level provider. I reviewed and 

agree with the findings presented.  I attest that I had a face-to-face 

encounter with the patient on the same day, and personally performed and 

documented my assessment and findings in the medical record. (Hebert Maldonado MD)








Cira Pozo Jun 6, 2017 12:39


Hebert Maldonado MD Jun 11, 2017 19:16